# Patient Record
Sex: FEMALE | Race: WHITE | Employment: UNEMPLOYED | URBAN - METROPOLITAN AREA
[De-identification: names, ages, dates, MRNs, and addresses within clinical notes are randomized per-mention and may not be internally consistent; named-entity substitution may affect disease eponyms.]

---

## 2022-02-10 ENCOUNTER — HOSPITAL ENCOUNTER (EMERGENCY)
Age: 87
Discharge: PSYCHIATRIC OR SUBSTANCE USE FACILITY | End: 2022-02-10
Attending: EMERGENCY MEDICINE
Payer: MEDICARE

## 2022-02-10 ENCOUNTER — HOSPITAL ENCOUNTER (INPATIENT)
Age: 87
LOS: 8 days | Discharge: HOME | DRG: 057 | End: 2022-02-18
Attending: PSYCHIATRY & NEUROLOGY | Admitting: PSYCHIATRY & NEUROLOGY
Payer: MEDICARE

## 2022-02-10 VITALS
SYSTOLIC BLOOD PRESSURE: 166 MMHG | BODY MASS INDEX: 28.01 KG/M2 | DIASTOLIC BLOOD PRESSURE: 81 MMHG | WEIGHT: 158.1 LBS | TEMPERATURE: 98 F | RESPIRATION RATE: 16 BRPM | HEART RATE: 82 BPM | HEIGHT: 63 IN | OXYGEN SATURATION: 97 %

## 2022-02-10 DIAGNOSIS — F03.91: Primary | ICD-10-CM

## 2022-02-10 DIAGNOSIS — F60.3 VOLATILE MOOD (HCC): ICD-10-CM

## 2022-02-10 DIAGNOSIS — R45.5 AGGRESSIVE OUTBURST: ICD-10-CM

## 2022-02-10 DIAGNOSIS — N30.00 ACUTE CYSTITIS WITHOUT HEMATURIA: ICD-10-CM

## 2022-02-10 DIAGNOSIS — R68.89 FORGETFULNESS: ICD-10-CM

## 2022-02-10 DIAGNOSIS — R46.89 VERBALLY ABUSIVE BEHAVIOR: Primary | ICD-10-CM

## 2022-02-10 PROBLEM — R41.0 DELIRIUM: Status: ACTIVE | Noted: 2022-02-10

## 2022-02-10 LAB
ACETAMINOPHEN LEVEL: <5 UG/ML (ref 10–30)
AMPHETAMINE SCREEN, URINE: NORMAL
ANION GAP SERPL CALCULATED.3IONS-SCNC: 14 MMOL/L (ref 3–16)
BACTERIA: ABNORMAL /HPF
BARBITURATE SCREEN URINE: NORMAL
BASOPHILS ABSOLUTE: 0 K/UL (ref 0–0.2)
BASOPHILS RELATIVE PERCENT: 0.4 %
BENZODIAZEPINE SCREEN, URINE: NORMAL
BILIRUBIN URINE: NEGATIVE
BLOOD, URINE: NEGATIVE
BUN BLDV-MCNC: 11 MG/DL (ref 7–20)
CALCIUM SERPL-MCNC: 9.3 MG/DL (ref 8.3–10.6)
CANNABINOID SCREEN URINE: NORMAL
CHLORIDE BLD-SCNC: 101 MMOL/L (ref 99–110)
CLARITY: ABNORMAL
CO2: 28 MMOL/L (ref 21–32)
COCAINE METABOLITE SCREEN URINE: NORMAL
COLOR: YELLOW
CREAT SERPL-MCNC: 0.7 MG/DL (ref 0.6–1.2)
EOSINOPHILS ABSOLUTE: 0.1 K/UL (ref 0–0.6)
EOSINOPHILS RELATIVE PERCENT: 1.7 %
EPITHELIAL CELLS, UA: 1 /HPF (ref 0–5)
ETHANOL: NORMAL MG/DL (ref 0–0.08)
GFR AFRICAN AMERICAN: >60
GFR NON-AFRICAN AMERICAN: >60
GLUCOSE BLD-MCNC: 120 MG/DL (ref 70–99)
GLUCOSE URINE: NEGATIVE MG/DL
HCT VFR BLD CALC: 46.4 % (ref 36–48)
HEMOGLOBIN: 15.3 G/DL (ref 12–16)
HYALINE CASTS: 0 /LPF (ref 0–8)
KETONES, URINE: NEGATIVE MG/DL
LEUKOCYTE ESTERASE, URINE: ABNORMAL
LYMPHOCYTES ABSOLUTE: 1.6 K/UL (ref 1–5.1)
LYMPHOCYTES RELATIVE PERCENT: 23.5 %
Lab: NORMAL
MCH RBC QN AUTO: 32.6 PG (ref 26–34)
MCHC RBC AUTO-ENTMCNC: 33 G/DL (ref 31–36)
MCV RBC AUTO: 98.8 FL (ref 80–100)
METHADONE SCREEN, URINE: NORMAL
MICROSCOPIC EXAMINATION: YES
MONOCYTES ABSOLUTE: 0.5 K/UL (ref 0–1.3)
MONOCYTES RELATIVE PERCENT: 7.8 %
NEUTROPHILS ABSOLUTE: 4.4 K/UL (ref 1.7–7.7)
NEUTROPHILS RELATIVE PERCENT: 66.6 %
NITRITE, URINE: NEGATIVE
OPIATE SCREEN URINE: NORMAL
OXYCODONE URINE: NORMAL
PDW BLD-RTO: 14.1 % (ref 12.4–15.4)
PH UA: 6
PH UA: 6.5 (ref 5–8)
PHENCYCLIDINE SCREEN URINE: NORMAL
PLATELET # BLD: 205 K/UL (ref 135–450)
PMV BLD AUTO: 9.4 FL (ref 5–10.5)
POTASSIUM REFLEX MAGNESIUM: 4.1 MMOL/L (ref 3.5–5.1)
PROPOXYPHENE SCREEN: NORMAL
PROTEIN UA: NEGATIVE MG/DL
RBC # BLD: 4.7 M/UL (ref 4–5.2)
RBC UA: 2 /HPF (ref 0–4)
SALICYLATE, SERUM: <0.3 MG/DL (ref 15–30)
SARS-COV-2, NAAT: NOT DETECTED
SODIUM BLD-SCNC: 143 MMOL/L (ref 136–145)
SPECIFIC GRAVITY UA: 1 (ref 1–1.03)
URINE REFLEX TO CULTURE: YES
URINE TYPE: ABNORMAL
UROBILINOGEN, URINE: 0.2 E.U./DL
WBC # BLD: 6.7 K/UL (ref 4–11)
WBC UA: 203 /HPF (ref 0–5)

## 2022-02-10 PROCEDURE — 87077 CULTURE AEROBIC IDENTIFY: CPT

## 2022-02-10 PROCEDURE — 87635 SARS-COV-2 COVID-19 AMP PRB: CPT

## 2022-02-10 PROCEDURE — 80143 DRUG ASSAY ACETAMINOPHEN: CPT

## 2022-02-10 PROCEDURE — 82077 ASSAY SPEC XCP UR&BREATH IA: CPT

## 2022-02-10 PROCEDURE — 81001 URINALYSIS AUTO W/SCOPE: CPT

## 2022-02-10 PROCEDURE — 1240000000 HC EMOTIONAL WELLNESS R&B

## 2022-02-10 PROCEDURE — 6370000000 HC RX 637 (ALT 250 FOR IP): Performed by: PHYSICIAN ASSISTANT

## 2022-02-10 PROCEDURE — 80179 DRUG ASSAY SALICYLATE: CPT

## 2022-02-10 PROCEDURE — 87184 SC STD DISK METHOD PER PLATE: CPT

## 2022-02-10 PROCEDURE — 2500000003 HC RX 250 WO HCPCS: Performed by: PSYCHIATRY & NEUROLOGY

## 2022-02-10 PROCEDURE — 80307 DRUG TEST PRSMV CHEM ANLYZR: CPT

## 2022-02-10 PROCEDURE — 85025 COMPLETE CBC W/AUTO DIFF WBC: CPT

## 2022-02-10 PROCEDURE — 87186 SC STD MICRODIL/AGAR DIL: CPT

## 2022-02-10 PROCEDURE — 2580000003 HC RX 258: Performed by: PSYCHIATRY & NEUROLOGY

## 2022-02-10 PROCEDURE — 87086 URINE CULTURE/COLONY COUNT: CPT

## 2022-02-10 PROCEDURE — 80048 BASIC METABOLIC PNL TOTAL CA: CPT

## 2022-02-10 PROCEDURE — 99285 EMERGENCY DEPT VISIT HI MDM: CPT

## 2022-02-10 RX ORDER — HYDROXYZINE HYDROCHLORIDE 10 MG/1
25 TABLET, FILM COATED ORAL 3 TIMES DAILY PRN
Status: DISCONTINUED | OUTPATIENT
Start: 2022-02-10 | End: 2022-02-18 | Stop reason: HOSPADM

## 2022-02-10 RX ORDER — CEFUROXIME AXETIL 250 MG/1
500 TABLET ORAL ONCE
Status: COMPLETED | OUTPATIENT
Start: 2022-02-10 | End: 2022-02-10

## 2022-02-10 RX ORDER — OLANZAPINE 10 MG/1
5 INJECTION, POWDER, LYOPHILIZED, FOR SOLUTION INTRAMUSCULAR EVERY 8 HOURS PRN
Status: DISCONTINUED | OUTPATIENT
Start: 2022-02-10 | End: 2022-02-18 | Stop reason: HOSPADM

## 2022-02-10 RX ORDER — LANOLIN ALCOHOL/MO/W.PET/CERES
3 CREAM (GRAM) TOPICAL NIGHTLY PRN
Status: DISCONTINUED | OUTPATIENT
Start: 2022-02-10 | End: 2022-02-18 | Stop reason: HOSPADM

## 2022-02-10 RX ORDER — DIPHENHYDRAMINE HYDROCHLORIDE 50 MG/ML
50 INJECTION INTRAMUSCULAR; INTRAVENOUS EVERY 4 HOURS PRN
Status: DISCONTINUED | OUTPATIENT
Start: 2022-02-10 | End: 2022-02-18 | Stop reason: HOSPADM

## 2022-02-10 RX ORDER — OLANZAPINE 5 MG/1
5 TABLET ORAL EVERY 8 HOURS PRN
Status: DISCONTINUED | OUTPATIENT
Start: 2022-02-10 | End: 2022-02-18 | Stop reason: HOSPADM

## 2022-02-10 RX ORDER — POLYETHYLENE GLYCOL 3350 17 G
2 POWDER IN PACKET (EA) ORAL
Status: DISCONTINUED | OUTPATIENT
Start: 2022-02-10 | End: 2022-02-18 | Stop reason: HOSPADM

## 2022-02-10 RX ORDER — ACETAMINOPHEN 325 MG/1
650 TABLET ORAL EVERY 4 HOURS PRN
Status: DISCONTINUED | OUTPATIENT
Start: 2022-02-10 | End: 2022-02-18 | Stop reason: HOSPADM

## 2022-02-10 RX ORDER — MAGNESIUM HYDROXIDE/ALUMINUM HYDROXICE/SIMETHICONE 120; 1200; 1200 MG/30ML; MG/30ML; MG/30ML
30 SUSPENSION ORAL EVERY 6 HOURS PRN
Status: DISCONTINUED | OUTPATIENT
Start: 2022-02-10 | End: 2022-02-18 | Stop reason: HOSPADM

## 2022-02-10 RX ORDER — IBUPROFEN 400 MG/1
400 TABLET ORAL EVERY 6 HOURS PRN
Status: DISCONTINUED | OUTPATIENT
Start: 2022-02-10 | End: 2022-02-18 | Stop reason: HOSPADM

## 2022-02-10 RX ADMIN — OLANZAPINE 5 MG: 10 INJECTION, POWDER, LYOPHILIZED, FOR SOLUTION INTRAMUSCULAR at 23:45

## 2022-02-10 RX ADMIN — CEFUROXIME AXETIL 500 MG: 250 TABLET ORAL at 19:07

## 2022-02-10 RX ADMIN — WATER 2.1 ML: 1 INJECTION INTRAMUSCULAR; INTRAVENOUS; SUBCUTANEOUS at 23:46

## 2022-02-10 ASSESSMENT — ENCOUNTER SYMPTOMS
NAUSEA: 0
VOMITING: 0
ABDOMINAL PAIN: 0
SHORTNESS OF BREATH: 0
CHEST TIGHTNESS: 0

## 2022-02-10 NOTE — ED PROVIDER NOTES
I have personally performed a face to face diagnostic evaluation on this patient. I have fully participated in the care of this patient I personally saw the patient and performed a substantive portion of the visit including all aspects of the medical decision making. I have reviewed and agree with all pertinent clinical information including history, physical exam, diagnostic tests, and the plan. HPI: Adan Ngo presented with anger and agitation and concern for psychiatric evaluation from nursing facility. Patient was verbally aggressive and threatening to staff at her nursing facility. Patient was supposed to go to [facility] for psychiatric inpatient however was unable to be sent there and so she was sent to [facility] for further psychiatric evaluation and potential placement or transfer. On my examination patient is significantly agitated not complaining of anything and reporting that she just wants food and wants everyone to leave her alone and she would like to go home. Patient is cursing at nursing staff. Chief Complaint   Patient presents with    Psychiatric Evaluation      Review of Systems: See ARMANDO note  Vital Signs: BP (!) 166/81   Pulse 82   Temp 98 °F (36.7 °C) (Oral)   Resp 16   Ht 5' 3\" (1.6 m)   Wt 158 lb 1.6 oz (71.7 kg)   SpO2 97%   BMI 28.01 kg/m²     Alert 81 y.o. female who appears significantly agitated  HENT: Atraumatic, oral mucosa moist  Neck: Grossly normal ROM  Chest/Lungs: respiratory effort normal   Musculoskeletal: Grossly normal ROM  Skin: No palor or diaphoresis  Psych: Patient is very agitated and upset demanding that she leave demanding that everyone leave her alone and that she wants dinner    Medical Decision Making and Plan:  Pertinent Labs & Imaging studies reviewed. (See ARMANDO chart for details)  I agree with assessment and plan.   Concern for severe agitation and/or geriatric psych patient cannot go back to her facility at this time as she was verbally threatening to staff and has been verbally threatening here. Will order psych work-up and plan on transfer for inpatient psychiatric evaluation. See ARMANDO note for further details. Patient found to have urinary tract infection however do not believe that this is pure delirium as patient has had symptoms for greater than 2 months. Patient is now medically cleared for psychiatric evaluation.       Jose Zuleta MD  02/10/22 1840       Jose Zuleta MD  02/10/22 2035
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
## 2022-02-11 PROBLEM — N30.00 ACUTE CYSTITIS WITHOUT HEMATURIA: Status: ACTIVE | Noted: 2022-02-11

## 2022-02-11 PROBLEM — E78.5 HYPERLIPIDEMIA: Status: ACTIVE | Noted: 2022-02-11

## 2022-02-11 LAB
CHOLESTEROL, TOTAL: 233 MG/DL (ref 0–199)
HDLC SERPL-MCNC: 63 MG/DL (ref 40–60)
LDL CHOLESTEROL CALCULATED: 149 MG/DL
TRIGL SERPL-MCNC: 103 MG/DL (ref 0–150)
VLDLC SERPL CALC-MCNC: 21 MG/DL

## 2022-02-11 PROCEDURE — 6370000000 HC RX 637 (ALT 250 FOR IP): Performed by: PSYCHIATRY & NEUROLOGY

## 2022-02-11 PROCEDURE — 6370000000 HC RX 637 (ALT 250 FOR IP): Performed by: PHYSICIAN ASSISTANT

## 2022-02-11 PROCEDURE — 1240000000 HC EMOTIONAL WELLNESS R&B

## 2022-02-11 PROCEDURE — 99223 1ST HOSP IP/OBS HIGH 75: CPT | Performed by: PSYCHIATRY & NEUROLOGY

## 2022-02-11 PROCEDURE — 99221 1ST HOSP IP/OBS SF/LOW 40: CPT | Performed by: PHYSICIAN ASSISTANT

## 2022-02-11 RX ORDER — MIRTAZAPINE 15 MG/1
15 TABLET, ORALLY DISINTEGRATING ORAL NIGHTLY
Status: DISCONTINUED | OUTPATIENT
Start: 2022-02-11 | End: 2022-02-17

## 2022-02-11 RX ORDER — NITROFURANTOIN 25; 75 MG/1; MG/1
100 CAPSULE ORAL EVERY 12 HOURS SCHEDULED
Status: DISPENSED | OUTPATIENT
Start: 2022-02-11 | End: 2022-02-16

## 2022-02-11 RX ORDER — DIVALPROEX SODIUM 125 MG/1
250 CAPSULE, COATED PELLETS ORAL EVERY 8 HOURS PRN
Status: DISCONTINUED | OUTPATIENT
Start: 2022-02-11 | End: 2022-02-18 | Stop reason: HOSPADM

## 2022-02-11 RX ADMIN — MIRTAZAPINE 15 MG: 15 TABLET, ORALLY DISINTEGRATING ORAL at 19:58

## 2022-02-11 RX ADMIN — NITROFURANTOIN (MONOHYDRATE/MACROCRYSTALS) 100 MG: 75; 25 CAPSULE ORAL at 19:58

## 2022-02-11 RX ADMIN — NITROFURANTOIN (MONOHYDRATE/MACROCRYSTALS) 100 MG: 75; 25 CAPSULE ORAL at 12:51

## 2022-02-11 ASSESSMENT — PAIN SCALES - GENERAL
PAINLEVEL_OUTOF10: 0

## 2022-02-11 NOTE — FLOWSHEET NOTE
Senior Purposeful Rounding     Position:Repositions Self     Physical Environment:Room free from clutter, Clear path to bathroom , Adequate lighting, Bed alarm functioning and No safety hazards noted     Pain Rating/ Nonverbal Pain Behaviors:denies;      Pain interventions Attempted: none     Patient Toileted:No- Void
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
## 2022-02-11 NOTE — ED NOTES
Report called to [address] RN at [facility], all questions answered.      Estela Ortiz RN  02/10/22 6746

## 2022-02-11 NOTE — PROGRESS NOTES
Pt arrived at 2310 via transport from [facility].  Pt was anxious, angry, and uncooperative. She kept insisting that there had been a mistake and she was supposed to go home. She stated, \"No one told me I was coming here. I was supposed to be going home. All they said was [facility]. \"  Pt became belligerent and refused to transfer from the stretcher from the bed. When staff attempted to assist her she became combative, trying to hit. Code violet was called. With additional staff present, she was able to be coerced into transferring to the bed. She refused vital signs, interview, 4 eyes, and physical assessment.

## 2022-02-11 NOTE — FLOWSHEET NOTE
02/11/22 1128   Psychiatric History   Psychiatric history treatment   (Unable to assess due to patient sleeping and when awake, is verbally aggressive and screaming.)   Contact information Lissa Kaufman   Are there any medication issues?   (Unable to assess due to patient sleeping and when awake, is verbally aggressive and screaming.)   Support System   Support system Other (Comment)  (Per chart record, has a guardian and lives in assisted living at [place].)   Problems in support system   (Unable to assess due to patient sleeping and when awake, is verbally aggressive and screaming.)   Current Living Situation   Home Living Adequate   Living information Lives with others  (Assisted living at [place] [facility])   Problems with living situation  No   Lack of basic needs No   SSDI/SSI Unable to assess due to patient sleeping and when awake, is verbally aggressive and screaming. Other government assistance Unable to assess due to patient sleeping and when awake, is verbally aggressive and screaming. Problems with environment Unable to assess due to patient sleeping and when awake, is verbally aggressive and screaming. Current abuse issues Unable to assess due to patient sleeping and when awake, is verbally aggressive and screaming. Supervised setting   (Unable to assess due to patient sleeping and when awake, is verbally aggressive and screaming.)   Relationship problems   (Unable to assess due to patient sleeping and when awake, is verbally aggressive and screaming.)   Contact information Lissa Kaufman   Medical and Self-Care Issues   Relevant medical problems Unable to assess due to patient sleeping and when awake, is verbally aggressive and screaming. Relevant self-care issues Unable to assess due to patient sleeping and when awake, is verbally aggressive and screaming.    Barriers to treatment   (Lives in assisted living at [facility])   formerly [facility]   Spouse/partner-name/age Unable to assess due to patient sleeping and when awake, is verbally aggressive and screaming. Children-names/ages Unable to assess due to patient sleeping and when awake, is verbally aggressive and screaming. Parents Unable to assess due to patient sleeping and when awake, is verbally aggressive and screaming. Siblings Unable to assess due to patient sleeping and when awake, is verbally aggressive and screaming. Childhood   Raised by   (Unable to assess due to patient sleeping and when awake, is verbally aggressive and screaming.)   Relevant family history Unable to assess due to patient sleeping and when awake, is verbally aggressive and screaming. History of abuse   (Unable to assess due to patient sleeping and when awake, is verbally aggressive and screaming.)   Legal History   Legal history   (Unable to assess due to patient sleeping and when awake, is verbally aggressive and screaming.)   Other relevant legal issues Unable to assess due to patient sleeping and when awake, is verbally aggressive and screaming. Comment Unable to assess due to patient sleeping and when awake, is verbally aggressive and screaming.    Juvenile legal history   (Unable to assess due to patient sleeping and when awake, is verbally aggressive and screaming.)    Abuse Assessment   Physical Abuse   (Unable to assess due to patient sleeping and when awake, is verbally aggressive and screaming.)   Verbal Abuse   (Unable to assess due to patient sleeping and when awake, is verbally aggressive and screaming.)   Possible abuse reported to   (Unable to assess due to patient sleeping and when awake, is verbally aggressive and screaming.)   Emotional abuse   (Unable to assess due to patient sleeping and when awake, is verbally aggressive and screaming.)   Financial Abuse   (Unable to assess due to patient sleeping and when awake, is verbally aggressive and screaming.)   Sexual abuse   (Unable to assess due to patient sleeping and when awake, is verbally aggressive and screaming.)   Elder abuse   (Unable to assess due to patient sleeping and when awake, is verbally aggressive and screaming.)   Substance Use   Use of substances    (Unable to assess due to patient sleeping and when awake, is verbally aggressive and screaming.)   Motivation for SA Treatment   Stage of engagement   (Unable to assess due to patient sleeping and when awake, is verbally aggressive and screaming.)   Motivation for treatment   (Unable to assess due to patient sleeping and when awake, is verbally aggressive and screaming.)   Current barriers to treatment   (Unable to assess due to patient sleeping and when awake, is verbally aggressive and screaming.)   Education   Education   (Unable to assess due to patient sleeping and when awake, is verbally aggressive and screaming.)   Special education   (Unable to assess due to patient sleeping and when awake, is verbally aggressive and screaming.)   Work History   Currently employed No   Recent job loss or change   (Unable to assess due to patient sleeping and when awake, is verbally aggressive and screaming.)    service   (Unable to assess due to patient sleeping and when awake, is verbally aggressive and screaming.)   /VA involvement Unable to assess due to patient sleeping and when awake, is verbally aggressive and screaming. Leisure/Activity   Past interests Unable to assess due to patient sleeping and when awake, is verbally aggressive and screaming. Present interests Unable to assess due to patient sleeping and when awake, is verbally aggressive and screaming. Current daily activity Unable to assess due to patient sleeping and when awake, is verbally aggressive and screaming.    Cultural and Spiritual   Spiritual concerns   (Unable to assess due to patient sleeping and when awake, is verbally aggressive and screaming.)   Cultural concerns   (Unable to assess due to patient sleeping and when awake, is verbally aggressive and screaming.)   Comment Unable to assess due to patient sleeping and when awake, is verbally aggressive and screaming. Collateral Contacts   Contacts   (Unable to assess due to patient sleeping and when awake, is verbally aggressive and screaming.)     Clinician attempted to meet with patient to complete assessment but she was sleeping. Per nursing staff, she was verbally aggressive and threatening when awake and would not cooperate with their assessment. Due to these reasons, this patient was unable to be assessed in person. Dipti Bowen was brought to the hospital due to extreme verbal aggression, yelling and threatening staff at [facility] where she resides in assisted living. Patient has a POA/Guardian, Carolyn Youngblood.      [address] Michael Queen, STEPHANY

## 2022-02-11 NOTE — ED NOTES
Patient ordered dinner at this time very aggressive with staff yelling and screaming about leaving this place and wanting to go home.       Josseline Marti RN  02/10/22 1910

## 2022-02-11 NOTE — PLAN OF CARE
Pt. Slept in this morning, irritable with staff initially upon awakening but was redirectable and has become compliant and received medications, ATB for UTI, good appetite, appears to be paranoid of nursing home staff stating they are conspiring against her. Withdrawn to bedroom, good safety awareness using the call light to alert staff.

## 2022-02-11 NOTE — ED NOTES
Patient continues to be confused and ask the same questions over again, while taking about the reason she got her. Patient education attempted at this time without success.      Reji Zuñiga RN  02/10/22 2043

## 2022-02-11 NOTE — ED NOTES
Patient continues to be aggressive and telling same story over and over again about how she ended up in this er and that she wants to go home gets very upset yelling at staff when she is told she isn't able to go home at this time     Aaron Nick RN  02/10/22 7973

## 2022-02-11 NOTE — PROGRESS NOTES
Pt continued to be verbally aggressive and uncooperative shortly following the code violet. She was agitated and yelling. 5mg Zyprexa IM PRN given. Sitter ordered for safety. Pt kept saying, \"Just leave me alone\". Medication was effective and she is currently sleeping.

## 2022-02-11 NOTE — ED NOTES
Patient continues to yell at staff about getting food and going home     Rayo Galdamez RN  02/10/22 3789

## 2022-02-11 NOTE — ED NOTES
Transport here at this time to transfer patient to [facility].       Beto Boss RN  02/10/22 7274

## 2022-02-11 NOTE — ED NOTES
Patient noted to be yelling at all staff when they walk past room      Saida Watters RN  02/10/22 8213

## 2022-02-11 NOTE — PROGRESS NOTES
Behavioral Services  Medicare Certification Upon Admission    I certify that this patient's inpatient psychiatric hospital admission is medically necessary for:    [x] (1) Treatment which could reasonably be expected to improve this patient's condition,       [x] (2) Or for diagnostic study;     AND     [x](2) The inpatient psychiatric services are provided while the individual is under the care of a physician and are included in the individualized plan of care.     Estimated length of stay/service 5 days    Plan for post-hospital care ECF    Electronically signed by Laura Darden MD on 2/11/2022 at 1:45 PM

## 2022-02-11 NOTE — H&P
Hospital Medicine History & Physical      PCP: No primary care provider on file. Date of Admission: 2/10/2022    Date of Service: Pt seen/examined on  2/11/2022     Chief Complaint:  Psych eval    History Of Present Illness: The patient is a 81 y.o. female without noted medical history who presented to [facility] for psychiatric eval.  Patient was seen and evaluated in the ED by the ED medical provider, patient was medically cleared for admission to [facility] at [facility]. This note serves as an admission medical H&P. Patient denies any medical complaints     Past Medical History:        Diagnosis Date    Arthritis     Chronic knee pain        Past Surgical History:        Procedure Laterality Date    APPENDECTOMY      CHOLECYSTECTOMY      TONSILLECTOMY         Medications Prior to Admission:    Prior to Admission medications    Not on File       Allergies:  Patient has no known allergies. Social History:  The patient currently lives in assisted living facility     TOBACCO:   reports that she has never smoked. She has never used smokeless tobacco.  ETOH:   reports previous alcohol use of about 3.0 standard drinks of alcohol per week. Family History:   Positive as follows:    History reviewed. No pertinent family history.     REVIEW OF SYSTEMS:       Constitutional: Negative for fever   HENT: Negative for sore throat   Eyes: Negative for redness   Respiratory: Negative  for dyspnea, cough   Cardiovascular: Negative for chest pain   Gastrointestinal: Negative for vomiting, diarrhea   Genitourinary: Negative for hematuria   Musculoskeletal: Negative for arthralgias   Skin: Negative for rash   Neurological: Negative for syncope    Hematological: Negative for easy bruising/bleeding   Psychiatric/Behavorial: Per psychiatry team evaluation     PHYSICAL EXAM:    BP (!) 144/81   Pulse 78   Temp 97.5 °F (36.4 °C) (Tympanic)   Resp 18   Ht 5' 3\" (1.6 m)   Wt 158 lb (71.7 kg)   SpO2 93%   BMI 27.99 kg/m²     Gen: No distress. Alert. Eyes: PERRL. No sclera icterus. No conjunctival injection. ENT: No discharge. Pharynx clear. Neck: No JVD. No Carotid Bruit. Trachea midline. Resp: No accessory muscle use. No crackles. No wheezes. No rhonchi. CV: Regular rate. Regular rhythm. No murmur. No rub. No edema. GI: Non-tender. Non-distended. Normal bowel sounds. Skin: Warm and dry. No nodule on exposed extremities. No rash on exposed extremities. M/S: No cyanosis. No joint deformity. No clubbing. Neuro: Awake. No focal neurologic deficit on exam.  Cranial nerves II through XII intact.    Psych: Per psychiatry team evaluation     CBC:   Recent Labs     02/10/22  1739   WBC 6.7   HGB 15.3   HCT 46.4   MCV 98.8        BMP:   Recent Labs     02/10/22  1739      K 4.1      CO2 28   BUN 11   CREATININE 0.7       UA:  Recent Labs     02/10/22  1655   COLORU YELLOW   PHUR 6.0  6.5   WBCUA 203*   RBCUA 2   BACTERIA 2+*   CLARITYU CLOUDY*   SPECGRAV 1.005   LEUKOCYTESUR LARGE*   UROBILINOGEN 0.2   BILIRUBINUR Negative   BLOODU Negative   GLUCOSEU Negative     U/A:    Lab Results   Component Value Date    COLORU YELLOW 02/10/2022    WBCUA 203 02/10/2022    RBCUA 2 02/10/2022    BACTERIA 2+ 02/10/2022    CLARITYU CLOUDY 02/10/2022    SPECGRAV 1.005 02/10/2022    LEUKOCYTESUR LARGE 02/10/2022    BLOODU Negative 02/10/2022    GLUCOSEU Negative 02/10/2022       CULTURES  None     EKG:  I have reviewed the EKG with the following interpretation:   None     RADIOLOGY  No orders to display           ASSESSMENT/PLAN:  #Delirium  - per psychiatry team    #UTI  - macrobid D#1/5  - f/u urine culture      Meche Valadez PA-C  2/11/2022 9:20 AM

## 2022-02-12 PROCEDURE — 6370000000 HC RX 637 (ALT 250 FOR IP): Performed by: PHYSICIAN ASSISTANT

## 2022-02-12 PROCEDURE — 1240000000 HC EMOTIONAL WELLNESS R&B

## 2022-02-12 PROCEDURE — 6370000000 HC RX 637 (ALT 250 FOR IP): Performed by: PSYCHIATRY & NEUROLOGY

## 2022-02-12 RX ADMIN — NITROFURANTOIN (MONOHYDRATE/MACROCRYSTALS) 100 MG: 75; 25 CAPSULE ORAL at 20:23

## 2022-02-12 RX ADMIN — NITROFURANTOIN (MONOHYDRATE/MACROCRYSTALS) 100 MG: 75; 25 CAPSULE ORAL at 10:13

## 2022-02-12 RX ADMIN — MIRTAZAPINE 15 MG: 15 TABLET, ORALLY DISINTEGRATING ORAL at 20:23

## 2022-02-12 ASSESSMENT — PAIN SCALES - GENERAL
PAINLEVEL_OUTOF10: 0

## 2022-02-12 NOTE — PROGRESS NOTES
Pt slept all day until around 515pm. States no one woke her up, writer attempted to wake up her multiple times, but did not get up to eat. States she needs to get out of here d/t her cat needs fed. She said this place is for crazies and she is not. Small open area to her buttocks, consult to wound to eval. Verbally aggressive towards staff, also stated if she had a gun she would shoot the lady that brought her here. Asked to use the phone, when asked what number to call she said the police station. Denies all.

## 2022-02-12 NOTE — FLOWSHEET NOTE
Senior Purposeful Rounding    Position:Repositions Self    Physical Environment:Room free from clutter, Clear path to bathroom , Adequate lighting, No safety hazards noted and Stay with me protocol    Pain Rating/ Nonverbal Pain Behaviors:denies;     Pain interventions Attempted: none    Patient Toileted:Continent

## 2022-02-12 NOTE — PLAN OF CARE
Pt A+Ox4, but can be forgetful. She asked the same question about UTI symptoms several times. She is mildly irritable, but cooperative and medication compliant. She denies SI/HI/AVH and no RTIS noted. She is visible on unit socializing with peers and staff. She gets around the independently with a wheelchair. She is currently resting in her room and has no other needs at this time.

## 2022-02-12 NOTE — PLAN OF CARE
Problem: Falls - Risk of:  Goal: Will remain free from falls  Description: Will remain free from falls  Outcome: Ongoing     Problem: Falls - Risk of:  Goal: Absence of physical injury  Description: Absence of physical injury  Outcome: Ongoing     Problem: Anger Management/Homicidal Ideation:  Goal: Ability to verbalize frustrations and anger appropriately will improve  Description: Ability to verbalize frustrations and anger appropriately will improve  2/12/2022 1050 by Abad Fuchs LPN  Outcome: Ongoing     Pt. Remains free of falls at this time. Sleeping in bed. Compliant with medications. Denies all. Will continue to monitor.

## 2022-02-13 PROCEDURE — 6370000000 HC RX 637 (ALT 250 FOR IP): Performed by: PSYCHIATRY & NEUROLOGY

## 2022-02-13 PROCEDURE — 6370000000 HC RX 637 (ALT 250 FOR IP): Performed by: PHYSICIAN ASSISTANT

## 2022-02-13 PROCEDURE — 1240000000 HC EMOTIONAL WELLNESS R&B

## 2022-02-13 PROCEDURE — 99233 SBSQ HOSP IP/OBS HIGH 50: CPT | Performed by: PSYCHIATRY & NEUROLOGY

## 2022-02-13 RX ADMIN — MIRTAZAPINE 15 MG: 15 TABLET, ORALLY DISINTEGRATING ORAL at 21:08

## 2022-02-13 RX ADMIN — NITROFURANTOIN (MONOHYDRATE/MACROCRYSTALS) 100 MG: 75; 25 CAPSULE ORAL at 21:08

## 2022-02-13 RX ADMIN — NITROFURANTOIN (MONOHYDRATE/MACROCRYSTALS) 100 MG: 75; 25 CAPSULE ORAL at 09:12

## 2022-02-13 RX ADMIN — ACETAMINOPHEN 650 MG: 325 TABLET ORAL at 22:24

## 2022-02-13 ASSESSMENT — PAIN SCALES - GENERAL
PAINLEVEL_OUTOF10: 3
PAINLEVEL_OUTOF10: 7

## 2022-02-13 NOTE — BH NOTE
Pt offered shower or bed bath several times today. Refuses after multiple offers. Cleansed skin after having changed brief.

## 2022-02-13 NOTE — PLAN OF CARE
Kirby Raza has been visible in the milieu. She is very irritable the majority of the time. Continually talking about this being \"the nuthouse\" and \"[place] Flew Over the [place]oo's Nest.\" She is oriented to person , place, and time but is also forgetful and will ask the same questions multiple times. She is paranoid and writing everything down in a notebook. Med compliant after asking several questions. Denies SI/HI/AVH. Using wheelchair for ambulation. Assisted to the [facility] where she was continent of urine. No combative behavior. Will monitor overnight.

## 2022-02-13 NOTE — BH NOTE
Senior Purposeful Rounding     Position:Repositions Self     Physical Environment:Room free from clutter, Clear path to bathroom , Adequate lighting, Bed alarm functioning and No safety hazards noted     Pain Rating/ Nonverbal Pain Behaviors:0; asleep     Pain interventions Attempted: n/a     Patient Toileted:No- Void

## 2022-02-13 NOTE — BH NOTE
Senior Purposeful Rounding     Position:Repositions Self     Physical Environment:Room free from clutter, Clear path to bathroom , Adequate lighting, Bed alarm functioning and No safety hazards noted     Pain Rating/ Nonverbal Pain Behaviors:0; denies     Pain interventions Attempted: n/a     Patient Toileted: Incontinent of urine

## 2022-02-13 NOTE — BH NOTE
Senior Purposeful Rounding    Position:Sitting    Physical Environment:Room free from clutter, Clear path to bathroom , Adequate lighting and No safety hazards noted    Pain Rating/ Nonverbal Pain Behaviors:denies; 0    Pain interventions Attempted: n/a    Patient Toileted:No- Void

## 2022-02-13 NOTE — BH NOTE
Senior Purposeful Rounding    Position:Sitting    Physical Environment:Room free from clutter, Clear path to bathroom , Adequate lighting and No safety hazards noted    Pain Rating/ Nonverbal Pain Behaviors:denies; 0    Pain interventions Attempted: n/a    Patient Toileted:Continent

## 2022-02-13 NOTE — FLOWSHEET NOTE
Purposeful Rounding     Patient concerns reported: none noted     Nurse made aware: no     Patient Turned and repositioned: Independent     Patient Toileted: Assist/Incontinent of Urine     Fall Precautions in Place: Yellow non-skid socks on, Lighting appropriate, Room free of clutter and Clear path to bathroom

## 2022-02-13 NOTE — PROGRESS NOTES
Purposeful Rounding     Patient concerns reported: none noted     Nurse made aware: no     Patient Turned and repositioned: Independent     Patient Toileted: Continent, Assisted to bathroom     Fall Precautions in Place: Yellow non-skid socks on, Lighting appropriate, Room free of clutter and Clear path to bathroom

## 2022-02-13 NOTE — BH NOTE
Senior Purposeful Rounding     Position:Repositions Self     Physical Environment:Room free from clutter, Clear path to bathroom , Adequate lighting, Bed alarm functioning and No safety hazards noted     Pain Rating/ Nonverbal Pain Behaviors:0; asleep     Pain interventions Attempted: n/a     Patient Toileted: no void

## 2022-02-13 NOTE — FLOWSHEET NOTE
Purposeful Rounding     Patient concerns reported: none noted     Nurse made aware: no     Patient Turned and repositioned: Independent     Patient Toileted: Declined     Fall Precautions in Place: Yellow non-skid socks on, Lighting appropriate, Room free of clutter and Clear path to bathroom

## 2022-02-14 PROBLEM — R41.0 DELIRIUM: Status: RESOLVED | Noted: 2022-02-10 | Resolved: 2022-02-14

## 2022-02-14 PROBLEM — S06.9X9S MAJOR NEUROCOGNITIVE DISORDER AS LATE EFFECT OF TRAUMATIC BRAIN INJURY WITH BEHAVIORAL DISTURBANCE (HCC): Status: ACTIVE | Noted: 2022-02-14

## 2022-02-14 PROBLEM — F02.818 MAJOR NEUROCOGNITIVE DISORDER AS LATE EFFECT OF TRAUMATIC BRAIN INJURY WITH BEHAVIORAL DISTURBANCE (HCC): Status: ACTIVE | Noted: 2022-02-14

## 2022-02-14 PROBLEM — F03.91: Status: ACTIVE | Noted: 2022-02-14

## 2022-02-14 LAB
ORGANISM: ABNORMAL
URINE CULTURE, ROUTINE: ABNORMAL

## 2022-02-14 PROCEDURE — 99232 SBSQ HOSP IP/OBS MODERATE 35: CPT | Performed by: PSYCHIATRY & NEUROLOGY

## 2022-02-14 PROCEDURE — 1240000000 HC EMOTIONAL WELLNESS R&B

## 2022-02-14 PROCEDURE — 6370000000 HC RX 637 (ALT 250 FOR IP): Performed by: PHYSICIAN ASSISTANT

## 2022-02-14 PROCEDURE — 6370000000 HC RX 637 (ALT 250 FOR IP): Performed by: PSYCHIATRY & NEUROLOGY

## 2022-02-14 RX ADMIN — NITROFURANTOIN (MONOHYDRATE/MACROCRYSTALS) 100 MG: 75; 25 CAPSULE ORAL at 21:14

## 2022-02-14 RX ADMIN — NITROFURANTOIN (MONOHYDRATE/MACROCRYSTALS) 100 MG: 75; 25 CAPSULE ORAL at 11:17

## 2022-02-14 RX ADMIN — MIRTAZAPINE 15 MG: 15 TABLET, ORALLY DISINTEGRATING ORAL at 21:14

## 2022-02-14 ASSESSMENT — PAIN SCALES - GENERAL: PAINLEVEL_OUTOF10: 0

## 2022-02-14 NOTE — BH NOTE
Senior Purposeful Rounding    Position:Repositions Self    Physical Environment:Room free from clutter, Clear path to bathroom , Adequate lighting, Bed alarm functioning and No safety hazards noted    Pain Rating/ Nonverbal Pain Behaviors:7, back    Pain interventions Attempted: PRN Tylenol effective    Patient Toileted:No- Void

## 2022-02-14 NOTE — BH NOTE
Senior Purposeful Rounding     Position:Sitting     Physical Environment:Room free from clutter, Clear path to bathroom , Adequate lighting and No safety hazards noted     Pain Rating/ Nonverbal Pain Behaviors:denies; 0     Pain interventions Attempted: n/a     Patient Toileted:Continent/incontinent

## 2022-02-14 NOTE — BH NOTE
Verbal consent for voluntary admission received via phone from pt's Isidro Hodge. Angelo Arroyo: [phone].       [facility]  2/14/2022

## 2022-02-14 NOTE — PLAN OF CARE
John Worrell is oriented to person, place, and time. She has been much less irritable tonight. Spent most of the evening out socializing with a female peer. She denies SI/HI/AVH. Med compliant. Both continent and incontinent of urine. Good appetite. Remains intrusive at times. Asleep at present. Will monitor overnight.

## 2022-02-14 NOTE — PROGRESS NOTES
Department of Psychiatry  Progress Note    Patient's chart was reviewed. Discussed with treatment team. Met with patient. SUBJECTIVE:      Sitting in room doorway with notebook. Suspicious. \"i've been waiting for you! Get me out of this hell. I'm going to [place]. \"    Mostly oriented thought believes it's May. Believes her friends and nursing home aids conspired to have her hospitalized because she is white. Can't think of any other reason she might be here. ROS:   Patient has new complaints: no  Sleeping adequately:  Yes   Appetite adequate: Yes  Engaged in programming: some    OBJECTIVE:  VITALS:  /82   Pulse 84   Temp 97.3 °F (36.3 °C) (Temporal)   Resp 16   Ht 5' 3\" (1.6 m)   Wt 158 lb (71.7 kg)   SpO2 92%   BMI 27.99 kg/m²     Mental Status Examination:    Appearance: fair grooming and hygiene  Behavior/Attitude toward examiner: grumpy, good eye contact  Speech: Normal rate, volume, amount  Mood:  \"fine\"  Affect:  irritable     Thought processes:  Goal directed, linear, no JABIER or gross disorganization  Thought Content: no SI, no HI, paranoid; delusional   Perceptions: not RTIS  Attention: attention span and concentration were intact to interview   Abstraction: intact  Cognition:  Alert and oriented to person, place, time, and situation, recall intact  Insight: Limited   Judgment: Limited    Medication:  Scheduled:   nitrofurantoin (macrocrystal-monohydrate)  100 mg Oral 2 times per day    mirtazapine  15 mg Oral Nightly        PRN:  divalproex, acetaminophen, ibuprofen, nicotine polacrilex, aluminum & magnesium hydroxide-simethicone, hydrOXYzine, diphenhydrAMINE, bisacodyl, melatonin, OLANZapine **OR** OLANZapine, sterile water     ASSESSMENT AND PLAN:    Principal Problem:    Delirium  Active Problems:    Acute cystitis without hematuria  Resolved Problems:    * No resolved hospital problems. *       1. Patient s symptoms   show no change  2. Probable discharge is undetermined 3.Discharge planning is incomplete  4. Suicidal ideation is absent    Total time with patient was 35 minutes and more than 50 % of that time was spent counseling the patient on their symptoms, treatment, and expected goals.        Karyle Neighbours, MD

## 2022-02-14 NOTE — PROGRESS NOTES
Department of Psychiatry  AttendingProgress Note    Patient continues to be irritable with staff. She was noted  Interacting with one peer and doing well. She has been noted to have poor memory and asking things over and over    The treatment team met and discussed the treatment plan. OBJECTIVE    Patient sleeping. Awakens briefly, then goes back to sleep    Physical  Vitals:    Blood pressure 136/86, pulse 82, temperature 97.9 °F (36.6 °C), temperature source Temporal, resp. rate 16, height 5' 3\" (1.6 m), weight 158 lb (71.7 kg), SpO2 96 %.   General appearance:  []  appears age, []  appears older than stated age,     []  adequately dressed and groomed, [] disheveled,                []  in no acute distress, [] appears mildly distressed,      []  Other:      MUSCULOSKELETAL:   Gait:   [] normal, [] antalgic, [] unsteady, [] in bed, gait not evaluated   Station:  [] erect, [] sitting, [] recumbent, [] other        Strength/tone:  [] muscle strength and tone appear consistent with age and condition     [] atrophy      [] abnormal movements  PSYCHIATRIC:    Relatedness:  [] cooperative, [] guarded, [] indifferent, [] hostile,      [] sedated  Speech:  [] normal prosody, [] pressured, [] decreased volume,    [] slurred, [] halting, [] slowed, [] loud     [] echolalia, [] incoherent, [] stuttering   Eye contact:  [] direct, [] avoidant, [] intense  Kinetics:  [] normal, [] increased, [] decreased  Mood:   [] euthymic, [] depressed, [] anxious, [] irritable,     [] labile  Affect:   [] normal range, [] constricted, [] depressed, [] anxious,     [] angry, [] blunted, [] flat  Hallucinations  [] denies, [] auditory,  [] visual,  [] olfactory, [] tactile  Delusions  [] none, [] grandiose,  [] jealous,  [] persecutory,  [] somatic,     [] bizarre  Preoccupations  [] none, [] violence, [] obsessions, [] other     Suicidal ideation  [] denies, [] endorses  Homicidal ideation [] denies, [] endorses  Thought process: [] logical, [] circumstantial, [] tangential     [] concrete, [] disorganized  Associations:  [] logical and coherent, [] loosening, [] disorganized  Insight:   [] good, [] fair, [] poor  Judgment:  [] good, [] fair, [] poor  Attention and concentration:     [] intact, [] limited, [] able to focus on interview,     [] grossly impaired  Orientation:  [] person, place, time, situation     [] disoriented to:     Memory:  Remote memory [] intact, [] impaired     Recent memory  [] intact, [] impaired          Data  Labs:   Admission on 02/10/2022   Component Date Value Ref Range Status    Cholesterol, Total 02/10/2022 233* 0 - 199 mg/dL Final    Triglycerides 02/10/2022 103  0 - 150 mg/dL Final    HDL 02/10/2022 63* 40 - 60 mg/dL Final    LDL Calculated 02/10/2022 149* <100 mg/dL Final    VLDL Cholesterol Calculated 02/10/2022 21  Not Established mg/dL Final            Medications  Current Facility-Administered Medications: nitrofurantoin (macrocrystal-monohydrate) (MACROBID) capsule 100 mg, 100 mg, Oral, 2 times per day  mirtazapine (REMERON SOL-TAB) disintegrating tablet 15 mg, 15 mg, Oral, Nightly  divalproex (DEPAKOTE SPRINKLE) capsule 250 mg, 250 mg, Oral, Q8H PRN  acetaminophen (TYLENOL) tablet 650 mg, 650 mg, Oral, Q4H PRN  ibuprofen (ADVIL;MOTRIN) tablet 400 mg, 400 mg, Oral, Q6H PRN  nicotine polacrilex (COMMIT) lozenge 2 mg, 2 mg, Oral, Q1H PRN  aluminum & magnesium hydroxide-simethicone (MAALOX) 200-200-20 MG/5ML suspension 30 mL, 30 mL, Oral, Q6H PRN  hydrOXYzine (ATARAX) tablet 25 mg, 25 mg, Oral, TID PRN  diphenhydrAMINE (BENADRYL) injection 50 mg, 50 mg, IntraMUSCular, Q4H PRN  bisacodyl (DULCOLAX) EC tablet 5 mg, 5 mg, Oral, Daily PRN  melatonin tablet 3 mg, 3 mg, Oral, Nightly PRN  OLANZapine (ZYPREXA) tablet 5 mg, 5 mg, Oral, Q8H PRN **OR** OLANZapine (ZYPREXA) injection 5 mg, 5 mg, IntraMUSCular, Q8H PRN  sterile water injection 2.1 mL, 2.1 mL, IntraMUSCular, Q4H PRN    ASSESSMENT AND PLAN    Principal Problem:    Major neurocognitive disorder due to possible Alzheimer's disease, with behavioral disturbance ([address]  Active Problems:    Acute cystitis without hematuria  Resolved Problems:    Delirium     Patient's delirium has resolved. Has been noted to have poor memory as a chronic condition. She is noted to have poor memory during this hospitalization and is suspected to have dementia. OT/PT have been consulted. 1.Patient's symptoms show no change  2. Probable discharge is next week  3. Discharge planning is incomplete    PLAN:  Continue Remeron for sleep/behavior/irritability  OT/PT

## 2022-02-14 NOTE — PLAN OF CARE
Problem: Falls - Risk of:  Goal: Will remain free from falls  Description: Will remain free from falls  2/14/2022 1421 by Alex Mcmahon RN  Outcome: Ongoing  2/14/2022 0131 by Reene Granados RN  Outcome: Ongoing     Problem: Anger Management/Homicidal Ideation:  Goal: Ability to verbalize frustrations and anger appropriately will improve  Description: Ability to verbalize frustrations and anger appropriately will improve  2/14/2022 1421 by Alex Mcmahon RN  Outcome: Ongoing  2/14/2022 0131 by Renee Granados RN  Outcome: Ongoing     Problem: Anger Management/Homicidal Ideation:  Goal: Absence of angry outbursts  Description: Absence of angry outbursts  2/14/2022 1421 by Alex Mcmahon RN  Outcome: Ongoing  2/14/2022 0131 by Renee Granados RN  Outcome: Ongoing   Pt incontinent of bowel and bladder but becomes irritated. Changed pt, taryn care completed and barrier cream applied. Small open area noted on right buttock. Pt refused shower. Refused to get out of bed again becoming agitated with writer when asked to do so. Ate 100% of lunch in bed, went back to sleep.

## 2022-02-15 PROCEDURE — 1240000000 HC EMOTIONAL WELLNESS R&B

## 2022-02-15 PROCEDURE — 6370000000 HC RX 637 (ALT 250 FOR IP): Performed by: PSYCHIATRY & NEUROLOGY

## 2022-02-15 PROCEDURE — 97162 PT EVAL MOD COMPLEX 30 MIN: CPT

## 2022-02-15 PROCEDURE — 6370000000 HC RX 637 (ALT 250 FOR IP): Performed by: PHYSICIAN ASSISTANT

## 2022-02-15 PROCEDURE — 97535 SELF CARE MNGMENT TRAINING: CPT

## 2022-02-15 PROCEDURE — 97116 GAIT TRAINING THERAPY: CPT

## 2022-02-15 PROCEDURE — 97542 WHEELCHAIR MNGMENT TRAINING: CPT

## 2022-02-15 PROCEDURE — 99231 SBSQ HOSP IP/OBS SF/LOW 25: CPT | Performed by: NURSE PRACTITIONER

## 2022-02-15 PROCEDURE — 99231 SBSQ HOSP IP/OBS SF/LOW 25: CPT | Performed by: PSYCHIATRY & NEUROLOGY

## 2022-02-15 PROCEDURE — 97530 THERAPEUTIC ACTIVITIES: CPT

## 2022-02-15 PROCEDURE — 97166 OT EVAL MOD COMPLEX 45 MIN: CPT

## 2022-02-15 RX ADMIN — MIRTAZAPINE 15 MG: 15 TABLET, ORALLY DISINTEGRATING ORAL at 20:54

## 2022-02-15 RX ADMIN — NITROFURANTOIN (MONOHYDRATE/MACROCRYSTALS) 100 MG: 75; 25 CAPSULE ORAL at 20:54

## 2022-02-15 NOTE — FLOWSHEET NOTE
Senior Purposeful Rounding    Position:Repositions Self    Physical Environment:Room free from clutter, Clear path to bathroom , Adequate lighting, Bed alarm functioning and No safety hazards noted    Pain Rating/ Nonverbal Pain Behaviors:pt sleeping    Pain interventions Attempted: NA    Patient Toileted:No- Void

## 2022-02-15 NOTE — PROGRESS NOTES
Progress Note    Admit Date:  2/10/2022    Subjective:  Ms. Chrissy Epstein seen sitting in the common area. States she needs to talk to the doctor because she needs to get out of here. Objective:   Vitals:    02/14/22 2030   BP: (!) 148/79   Pulse: 72   Resp: 18   Temp: 97.5 °F (36.4 °C)   SpO2: 96%        No intake or output data in the 24 hours ending 02/15/22 1255    Physical Exam:    Gen: No distress. Alert. Eyes: PERRL. No sclera icterus. No conjunctival injection. ENT: No discharge. Pharynx clear. Neck: No JVD. No Carotid Bruit. Trachea midline. Resp: No accessory muscle use. No crackles. No wheezes. No rhonchi. CV: Regular rate. Regular rhythm. No murmur. No rub. No edema. GI: Non-tender. Non-distended. Normal bowel sounds. Skin: Warm and dry. No nodule on exposed extremities. No rash on exposed extremities. M/S: No cyanosis. No joint deformity. No clubbing. Neuro: Awake. No focal neurologic deficit on exam.  Cranial nerves II through XII intact. Psych: Per psychiatry team evaluation     Data:  CBC: No results for input(s): WBC, HGB, HCT, MCV, PLT in the last 72 hours. BMP: No results for input(s): NA, K, CL, CO2, PHOS, BUN, CREATININE, CA in the last 72 hours. LIVER PROFILE: No results for input(s): AST, ALT, LIPASE, BILIDIR, BILITOT, ALKPHOS in the last 72 hours. Invalid input(s): AMYLASE,  ALB  PT/INR: No results for input(s): PROTIME, INR in the last 72 hours.     CULTURES  Susceptibility     Escherichia coli (esbl)     BACTERIAL SUSCEPTIBILITY PANEL BY FRANCISCO     amoxicillin-clavulanate <=8/4 mcg/mL Sensitive     ampicillin >16 mcg/mL Resistant     ampicillin-sulbactam <=8/4 mcg/mL Sensitive     ceFAZolin >16 mcg/mL Resistant     cefepime >16 mcg/mL Resistant     cefTRIAXone >32 mcg/mL Resistant     cefuroxime >16 mcg/mL Resistant     ciprofloxacin <=1 mcg/mL Sensitive     ertapenem <=0.5 mcg/mL Sensitive     gentamicin <=4 mcg/mL Sensitive     meropenem <=1 mcg/mL Sensitive nitrofurantoin <=32 mcg/mL Sensitive     trimethoprim-sulfamethoxazole <=2/38 mcg/mL Sensitive      COVID: not detected      Assessment/Plan:  #Delirium  - per psychiatry team     #UTI  - macrobid D#4/5  - urine cx as above. Refused dose this morning. Diet: ADULT DIET;  Regular  Code Status: Full Code    Joce Mejia [facility]  2/15/2022

## 2022-02-15 NOTE — FLOWSHEET NOTE
Senior Purposeful Rounding    Position:Repositions Self    Physical Environment:Room free from clutter, Clear path to bathroom , Adequate lighting and No safety hazards noted    Pain Rating/ Nonverbal Pain Behaviors:0; none    Pain interventions Attempted: NA    Patient Toileted: No Void

## 2022-02-15 NOTE — PROGRESS NOTES
Department of Psychiatry  AttendingProgress Note    Chief Complaint: \"I'm not crazy\"    Sujatha Vines is annoyed about still being here and wants to go back to [facility]. She denies having caused a disturbance there. She says it is \"all lies. \"    She has not had any violent behavior since the first day she arrived. She has grouchy times in the morning but gets nicer in the afternoon. Memory still poor. The treatment team met and discussed the treatment plan. SUBJECTIVE:        Suicidal ideation:  denies suicidal ideation. Patient does not have medication side effects. ROS: Patient has new complaints: no  Sleeping adequately:  Yes   Appetite adequate: Yes  Attending groups: Yes      OBJECTIVE    Physical  Vitals:    Blood pressure (!) 148/79, pulse 72, temperature 97.5 °F (36.4 °C), temperature source Temporal, resp. rate 18, height 5' 3\" (1.6 m), weight 158 lb (71.7 kg), SpO2 96 %.   General appearance:  [x]  appears age, []  appears older than stated age,     [x]  adequately dressed and groomed, [] disheveled,                []  in no acute distress, [] appears mildly distressed,      []  Other:      MUSCULOSKELETAL:   Gait:   [x] normal, [] antalgic, [] unsteady, [] in bed, gait not evaluated   Station:  [x] erect, [] sitting, [] recumbent, [] other        Strength/tone:  [x] muscle strength and tone appear consistent with age and condition     [] atrophy      [] abnormal movements  PSYCHIATRIC:    Relatedness:  [x] cooperative, [] guarded, [] indifferent, [] hostile,      [] sedated  Speech:  [x] normal prosody, [] pressured, [] decreased volume,    [] slurred, [] halting, [] slowed, [] loud     [] echolalia, [] incoherent, [] stuttering   Eye contact:  [x] direct, [] avoidant, [] intense  Kinetics:  [x] normal, [] increased, [] decreased  Mood:   [x] euthymic, [] depressed, [] anxious, [] irritable,     [] labile  Affect:   [] normal range, [x] constricted, [] depressed, [] anxious,     [] angry, [] blunted, [] flat  Hallucinations  [x] denies, [] auditory,  [] visual,  [] olfactory, [] tactile  Delusions  [x] none, [] grandiose,  [] jealous,  [] persecutory,  [] somatic,     [] bizarre  Preoccupations  [] none, [] violence, [] obsessions, [] other     Suicidal ideation  [x] denies, [] endorses  Homicidal ideation [x] denies, [] endorses  Thought process: [x] logical, [] circumstantial, [] tangential     [] concrete, [] disorganized  Associations:  [x] logical and coherent, [] loosening, [] disorganized  Insight:   [] good, [] fair, [x] poor  Judgment:  [] good, [] fair, [x] poor  Attention and concentration:     [] intact, [x] limited, [] able to focus on interview,     [] grossly impaired  Orientation:  [] person, place, time, situation     [x] disoriented to: Place, date    Memory:  Remote memory [] intact, [x] impaired     Recent memory  [] intact, [x] impaired          Data  Labs:   Admission on 02/10/2022   Component Date Value Ref Range Status    Cholesterol, Total 02/10/2022 233* 0 - 199 mg/dL Final    Triglycerides 02/10/2022 103  0 - 150 mg/dL Final    HDL 02/10/2022 63* 40 - 60 mg/dL Final    LDL Calculated 02/10/2022 149* <100 mg/dL Final    VLDL Cholesterol Calculated 02/10/2022 21  Not Established mg/dL Final            Medications  Current Facility-Administered Medications: nitrofurantoin (macrocrystal-monohydrate) (MACROBID) capsule 100 mg, 100 mg, Oral, 2 times per day  mirtazapine (REMERON SOL-TAB) disintegrating tablet 15 mg, 15 mg, Oral, Nightly  divalproex (DEPAKOTE SPRINKLE) capsule 250 mg, 250 mg, Oral, Q8H PRN  acetaminophen (TYLENOL) tablet 650 mg, 650 mg, Oral, Q4H PRN  ibuprofen (ADVIL;MOTRIN) tablet 400 mg, 400 mg, Oral, Q6H PRN  nicotine polacrilex (COMMIT) lozenge 2 mg, 2 mg, Oral, Q1H PRN  aluminum & magnesium hydroxide-simethicone (MAALOX) 200-200-20 MG/5ML suspension 30 mL, 30 mL, Oral, Q6H PRN  hydrOXYzine (ATARAX) tablet 25 mg, 25 mg, Oral, TID PRN  diphenhydrAMINE (BENADRYL) injection 50 mg, 50 mg, IntraMUSCular, Q4H PRN  bisacodyl (DULCOLAX) EC tablet 5 mg, 5 mg, Oral, Daily PRN  melatonin tablet 3 mg, 3 mg, Oral, Nightly PRN  OLANZapine (ZYPREXA) tablet 5 mg, 5 mg, Oral, Q8H PRN **OR** OLANZapine (ZYPREXA) injection 5 mg, 5 mg, IntraMUSCular, Q8H PRN  sterile water injection 2.1 mL, 2.1 mL, IntraMUSCular, Q4H PRN    ASSESSMENT AND PLAN    Principal Problem:    Major neurocognitive disorder due to 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 Patient's symptoms show no change  2. Probable discharge is next week  3. Discharge planning is incomplete    PLAN:  Continue current meds  Discharge planning underway      Total time with patient was 40 minutes and more than 50 % of that time was spent counseling the patient on their symptoms, treatment and expected goals.

## 2022-02-15 NOTE — BH NOTE
Wound care nurse phoned Mallory lora to continue to apply barrier cream and she will be in to assess pt on 2/15

## 2022-02-15 NOTE — FLOWSHEET NOTE
Senior Purposeful Rounding    Position:Repositions Self    Physical Environment:No safety hazards noted, Bed alarm    Pain Rating/ Nonverbal Pain Behaviors:denies; none    Pain interventions Attempted: NA    Patient Toileted:Incontinent of bladder, incontinence care provided, diaper changed and barrier cream applied
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
## 2022-02-15 NOTE — CONSULTS
[facility] Nurse  Consult Note       NAME:  Collie Skiff  MEDICAL RECORD NUMBER:  [id number]  AGE: 81 y.o. GENDER: female  : 1935  TODAY'S DATE:  2/15/2022    Subjective Pt agreeable to assessment, cooperative. Reason for WOCN Evaluation and Assessment: buttocks, sacrum      Collie Skiff is a 81 y.o. female referred by:   [x] Physician  [x] Nursing  [] Other:     Wound Identification:  Wound Type: MASD  Contributing Factors: incontinence of stool and incontinence of urine    Consult received for open areas to buttocks. Pt is incontinent of urine and stool, attends in place. Area appears to be resolving. Staff has been applying zinc moisture barrier, already in room    Patient Goal of Care:  [x] Wound Healing  [] Odor Control  [] Palliative Care  [] Pain Control   [] Other:         PAST MEDICAL HISTORY        Diagnosis Date    Arthritis     Chronic knee pain        PAST SURGICAL HISTORY    Past Surgical History:   Procedure Laterality Date    APPENDECTOMY      CHOLECYSTECTOMY      TONSILLECTOMY         FAMILY HISTORY    History reviewed. No pertinent family history. SOCIAL HISTORY    Social History     Tobacco Use    Smoking status: Never Smoker    Smokeless tobacco: Never Used   Substance Use Topics    Alcohol use: Not Currently     Alcohol/week: 3.0 standard drinks     Types: 3 Cans of beer per week    Drug use: No       ALLERGIES    No Known Allergies    MEDICATIONS    No current facility-administered medications on file prior to encounter. No current outpatient medications on file prior to encounter.        Objective    BP (!) 148/79   Pulse 72   Temp 97.5 °F (36.4 °C) (Temporal)   Resp 18   Ht 5' 3\" (1.6 m)   Wt 158 lb (71.7 kg)   SpO2 96%   BMI 27.99 kg/m²     LABS:  WBC:    Lab Results   Component Value Date    WBC 6.7 02/10/2022     H/H:    Lab Results   Component Value Date    HGB 15.3 02/10/2022    HCT 46.4 02/10/2022     PTT:  No results found for: APTT, PTT[APTT}  PT/INR:  No results found for: PROTIME, INR  HgBA1c:  No results found for: LABA1C    Assessment   Nicolás Risk Score: Nicolás Scale Score: 18    Patient Active Problem List   Diagnosis Code    Infection of finger L08.9    Acute cystitis without hematuria N30.00    Major neurocognitive disorder due to possible Alzheimer's disease, with behavioral disturbance ([facility] [address] F03.91       Measurements:  Wound 11/30/13  Finger (Comment which one) Right (Active)   Number of days: 2998     buttocks:  Small amount of healing partial thickness skin loss to left buttock. Bilateral buttocks with chronic dusky discoloration. Response to treatment:  Well tolerated by patient. Pain Assessment:  Severity:  0 / 10  Quality of pain: N/A  Wound Pain Timing/Severity: none  Premedicated: N/A      Buttocks:  Utilize Zinc Oxide moisture barrier. Seat cushion when pt in chair. Change attends as soon as possible with incontinent episodes. Plan of Care:      Specialty Bed Required : No   [] Low Air Loss   [] Pressure Redistribution  [] Fluid Immersion  [] Bariatric  [] Total Pressure Relief  [] Other:     Current Diet: ADULT DIET;  Regular  Dietician consult:  N/A    Discharge Plan:  Placement for patient upon discharge: TBD  Patient appropriate for Outpatient [address]: No    Referrals:  [x]   [] [facility]  [] Supplies  [] Other    Patient/Caregiver Teaching:  Level of patient/caregiver understanding able to:   [x] Indicates understanding       [x] Needs reinforcement  [] Unsuccessful      [] Verbal Understanding  [] Demonstrated understanding       [] No evidence of learning  [] Refused teaching         [] N/A       Electronically signed by Renu George RN, CWOCN on 2/15/2022 at 1:38 PM

## 2022-02-15 NOTE — BH NOTE
Purposeful Rounding    Patient concerns reported:none noted    Nurse made aware:no    Patient Turned and repositioned: Independent    Patient Toileted: Independent    Fall Precautions in Place: Yellow non-skid socks on, Lighting appropriate, Room free of clutter and Clear path to bathroom      Electronically signed by Jacque Ruggiero on 2/15/22 at 11:03 AM EST

## 2022-02-15 NOTE — BH NOTE
Purposeful Rounding    Patient concerns reported:none noted    Nurse made aware:no    Patient Turned and repositioned: Independent    Patient Toileted: Independent    Fall Precautions in Place: Yellow non-skid socks on, Lighting appropriate, Room free of clutter and Clear path to bathroom      Electronically signed by Terese Bowman on 2/15/22 at 5:47 PM EST

## 2022-02-15 NOTE — GROUP NOTE
Group Therapy Note    Date: 2/15/2022    Group Start Time: 1330  Group End Time: 2835  Group Topic: Recreational    [id number]        Group Therapy Note    Attendees: 4    Group members engaged in reminiscing therapy. Notes:  Jordon George engaged in conversation about her Psychology Degree as well as her career at [employer].      Status After Intervention:  Improved    Participation Level: Interactive    Participation Quality: Appropriate      Speech:  normal      Thought Process/Content: Logical      Affective Functioning: Blunted      Mood: irritable      Level of consciousness:  Alert      Response to Learning: Able to verbalize current knowledge/experience      Endings: None Reported    Modes of Intervention: Exploration      Discipline Responsible: Behavorial Health Tech      Signature:  SUE [place]

## 2022-02-15 NOTE — BH NOTE
This morning, following engagement in treatment from ancillary staff, Berl Party is sitting at the table eating breakfast. Adamantly refusing socialization and offered resources for leisure/recreation. Argumentative and minimizing needs, refusing all offered help. Will follow up for continued encouragement to engage in unit milieu as appropriate.      Britt Jones MM, MT-BC  Board-Certified Music Therapist  Psychoeducation Specialist

## 2022-02-15 NOTE — FLOWSHEET NOTE
Senior Purposeful Rounding    Position:Repositions Self    Physical Environment:Clear path to bathroom , Adequate lighting, Bed alarm functioning and No safety hazards noted    Pain Rating/ Nonverbal Pain Behaviors:pt sleeping    Pain interventions Attempted: NA    Patient Toileted:No- Void

## 2022-02-15 NOTE — PLAN OF CARE
Patient denies SI/HI, AVH and pain. Patient medication compliant and had evening snack. Patient incontinent of urine, incontinence care provided and barrier cream apply, pt has an open area to right buttock. Patient cooperative with care. Patient continent of bowel and had large BM. Patient asking staff \"How do I get out of this place? \" Patient stated,\" My friend the nurse that takes care of me, her  is an  and he will lillie for me. I shouldn't be here. I'm not crazy. \" Patient free of falls and no angry outbursts. Patient monitored q 15 minutes for safety.
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
## 2022-02-16 PROCEDURE — 99231 SBSQ HOSP IP/OBS SF/LOW 25: CPT | Performed by: PSYCHIATRY & NEUROLOGY

## 2022-02-16 PROCEDURE — 1240000000 HC EMOTIONAL WELLNESS R&B

## 2022-02-16 PROCEDURE — 6370000000 HC RX 637 (ALT 250 FOR IP): Performed by: PSYCHIATRY & NEUROLOGY

## 2022-02-16 ASSESSMENT — PAIN SCALES - GENERAL: PAINLEVEL_OUTOF10: 0

## 2022-02-16 NOTE — PROGRESS NOTES
Pt continues to be irritable w/ staff. She is cooperative w/ care. Incontinent of urine. Continent of BM x1. No medications due this shift. Pt is up w/ x1 min assist to transfer to w/c. Pt is able to maneuver w/c independently to get around unit. Pt denies SI/HI/AVH, no RTIS noted. Pt does continue to express paranoia regarding staff at [facility] and staff here on the unit. Will continue to monitor.

## 2022-02-16 NOTE — PLAN OF CARE
Patient visible and social with select peer watching TV. Med compliant. Patient denies SI/HI/AVH. Patient sarcastic at times but cooperative. No behavioral issues Will continue to monitor.

## 2022-02-16 NOTE — BH NOTE
Senior Purposeful Rounding     Position:Repositions Self     Physical Environment:Room free from clutter, Clear path to bathroom , Adequate lighting, Bed alarm functioning and No safety hazards noted     Pain Rating/ Nonverbal Pain Behaviors:0; asleep     Pain interventions Attempted: n/a     Patient Toileted:No void

## 2022-02-16 NOTE — PROGRESS NOTES
Cala Shone is angry at me because she thinks she doesn't belong here, because \"I'm not a crazy person. \"  She tells me she wants to be left alone and doesn't want anything except to \"get out of here. \"    She continues to be grouchy, keeps to herself in her room much of the time. Sleeping at night. Repeatedly      The treatment team met and discussed the treatment plan.     SUBJECTIVE:         Suicidal ideation:  denies suicidal ideation. Patient does not have medication side effects. ROS: Patient has new complaints: no  Sleeping adequately:  Yes   Appetite adequate: Yes  Attending groups: Yes        OBJECTIVE     Physical  Vitals:                           Blood pressure (!) 148/79, pulse 72, temperature 97.5 °F (36.4 °C), temperature source Temporal, resp. rate 18, height 5' 3\" (1.6 m), weight 158 lb (71.7 kg), SpO2 96 %. General appearance:  [x]? appears age, []?  appears older than stated age,                                      [x]?   adequately dressed and groomed, []? disheveled,                                      []?  in no acute distress, []? appears mildly distressed,                                      []?  Other:                                  MUSCULOSKELETAL:            Gait:                             [x]? normal, []? antalgic, []? unsteady, []? in bed, gait not evaluated   Station:                        []? erect, [x]? sitting, []? recumbent, []? other                             Strength/tone:             [x]? muscle strength and tone appear consistent with age and condition                                      []? atrophy                                       []? abnormal movements  PSYCHIATRIC:    Relatedness:               [x]? cooperative, []? guarded, []? indifferent, []? hostile,                                               []? sedated  Speech:                       [x]? normal prosody, []? pressured, []? decreased volume,              []? slurred, []? halting, []? slowed, []? loud []? echolalia, []? incoherent, []? stuttering   Eye contact:                [x]? direct, []? avoidant, []? intense  Kinetics:                      [x]? normal, []? increased, []? decreased  Mood:                          [x]? euthymic, []? depressed, []? anxious, []? irritable,                                      []? labile  Affect:                          []? normal range, [x]? constricted, []? depressed, []? anxious,                                      []? angry, []? blunted, []? flat  Hallucinations              [x]? denies, []? auditory,  []? visual,  []? olfactory, []? tactile  Delusions                    [x]? none, []? grandiose,  []? jealous,  []? persecutory,  []? somatic,                                      []? bizarre  Preoccupations           []? none, []? violence, []? obsessions, []? other                                   Suicidal ideation          [x]? denies, []? endorses  Homicidal ideation      [x]? denies, []? endorses  Thought process:        [x]? logical, []? circumstantial, []? tangential                                      []? concrete, []? disorganized  Associations:               [x]? logical and coherent, []? loosening, []? disorganized  Insight:                         []? good, []? fair, [x]? poor  Judgment:                   []? good, []? fair, [x]? poor  Attention and concentration:                                      []? intact, [x]? limited, []? able to focus on interview,                                      []? grossly impaired  Orientation:                 []? person, place, time, situation                                      [x]? disoriented to: Place, date               Memory:                      Remote memory []? intact, [x]? impaired                                      Recent memory  []? intact, [x]?  impaired                             Data  Labs:           Admission on 02/10/2022   Component Date Value Ref Range Status    Cholesterol, Total 02/10/2022 233* 0 - 199 mg/dL Final    Triglycerides 02/10/2022 103  0 - 150 mg/dL Final    HDL 02/10/2022 63* 40 - 60 mg/dL Final    LDL Calculated 02/10/2022 149* <100 mg/dL Final    VLDL Cholesterol Calculated 02/10/2022 21  Not Established mg/dL Final             Medications    Current Hospital Medications   Current Facility-Administered Medications: nitrofurantoin (macrocrystal-monohydrate) (MACROBID) capsule 100 mg, 100 mg, Oral, 2 times per day  mirtazapine (REMERON SOL-TAB) disintegrating tablet 15 mg, 15 mg, Oral, Nightly  divalproex (DEPAKOTE SPRINKLE) capsule 250 mg, 250 mg, Oral, Q8H PRN  acetaminophen (TYLENOL) tablet 650 mg, 650 mg, Oral, Q4H PRN  ibuprofen (ADVIL;MOTRIN) tablet 400 mg, 400 mg, Oral, Q6H PRN  nicotine polacrilex (COMMIT) lozenge 2 mg, 2 mg, Oral, Q1H PRN  aluminum & magnesium hydroxide-simethicone (MAALOX) 200-200-20 MG/5ML suspension 30 mL, 30 mL, Oral, Q6H PRN  hydrOXYzine (ATARAX) tablet 25 mg, 25 mg, Oral, TID PRN  diphenhydrAMINE (BENADRYL) injection 50 mg, 50 mg, IntraMUSCular, Q4H PRN  bisacodyl (DULCOLAX) EC tablet 5 mg, 5 mg, Oral, Daily PRN  melatonin tablet 3 mg, 3 mg, Oral, Nightly PRN  OLANZapine (ZYPREXA) tablet 5 mg, 5 mg, Oral, Q8H PRN **OR** OLANZapine (ZYPREXA) injection 5 mg, 5 mg, IntraMUSCular, Q8H PRN  sterile water injection 2.1 mL, 2.1 mL, IntraMUSCular, Q4H PRN        ASSESSMENT AND PLAN     Principal Problem:    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 Patient's symptoms show no change  2. Probable discharge is next week  3. Discharge planning is incomplete     PLAN:  Continue current meds  Discharge planning underway

## 2022-02-16 NOTE — PROGRESS NOTES
Physical Therapy    Attempted to see pt for PT tx. Pt asleep in room upon arrival. When woken pt refusing to work with therapy and requesting to rest right now. Will follow-up as PT schedule and pt status permit. No Charge.     Cuate Cordova, PT, DPT, OMT-C # [id number]

## 2022-02-16 NOTE — BH NOTE
Senior Purposeful Rounding    Position:Repositions Self    Physical Environment:Room free from clutter, Clear path to bathroom , Adequate lighting, Bed alarm functioning and No safety hazards noted    Pain Rating/ Nonverbal Pain Behaviors:0; asleep    Pain interventions Attempted: n/a    Patient Toileted:Continent

## 2022-02-17 PROCEDURE — 6370000000 HC RX 637 (ALT 250 FOR IP): Performed by: NURSE PRACTITIONER

## 2022-02-17 PROCEDURE — 1240000000 HC EMOTIONAL WELLNESS R&B

## 2022-02-17 PROCEDURE — 97116 GAIT TRAINING THERAPY: CPT

## 2022-02-17 PROCEDURE — 6370000000 HC RX 637 (ALT 250 FOR IP): Performed by: PSYCHIATRY & NEUROLOGY

## 2022-02-17 PROCEDURE — 99231 SBSQ HOSP IP/OBS SF/LOW 25: CPT | Performed by: NURSE PRACTITIONER

## 2022-02-17 PROCEDURE — 99232 SBSQ HOSP IP/OBS MODERATE 35: CPT | Performed by: PSYCHIATRY & NEUROLOGY

## 2022-02-17 PROCEDURE — 97535 SELF CARE MNGMENT TRAINING: CPT

## 2022-02-17 RX ORDER — MIRTAZAPINE 15 MG/1
15 TABLET, FILM COATED ORAL NIGHTLY
Status: DISCONTINUED | OUTPATIENT
Start: 2022-02-17 | End: 2022-02-18 | Stop reason: HOSPADM

## 2022-02-17 RX ORDER — NITROFURANTOIN 25; 75 MG/1; MG/1
100 CAPSULE ORAL ONCE
Status: COMPLETED | OUTPATIENT
Start: 2022-02-17 | End: 2022-02-17

## 2022-02-17 RX ADMIN — MIRTAZAPINE 15 MG: 15 TABLET, FILM COATED ORAL at 20:07

## 2022-02-17 RX ADMIN — NITROFURANTOIN (MONOHYDRATE/MACROCRYSTALS) 100 MG: 75; 25 CAPSULE ORAL at 14:06

## 2022-02-17 ASSESSMENT — PAIN SCALES - GENERAL: PAINLEVEL_OUTOF10: 0

## 2022-02-17 NOTE — PROGRESS NOTES
Progress Note    Admit Date:  2/10/2022    Subjective:  Ms. Dev George seen at the table eating lunch. Denies any complaints. Objective:   Vitals:    02/17/22 0803   BP: 132/76   Pulse: 84   Resp: 18   Temp: 98.2 °F (36.8 °C)   SpO2: 93%            Intake/Output Summary (Last 24 hours) at 2/17/2022 1227  Last data filed at 2/17/2022 0458  Gross per 24 hour   Intake 240 ml   Output --   Net 240 ml       Physical Exam:    Gen: No distress. Alert. Eyes: PERRL. No sclera icterus. No conjunctival injection. ENT: No discharge. Pharynx clear. Neck: No JVD. No Carotid Bruit. Trachea midline. Resp: No accessory muscle use. No crackles. No wheezes. No rhonchi. CV: Regular rate. Regular rhythm. No murmur. No rub. No edema. GI: Non-tender. Non-distended. Normal bowel sounds. Skin: Warm and dry. No nodule on exposed extremities. No rash on exposed extremities. M/S: No cyanosis. No joint deformity. No clubbing. Neuro: Awake. No focal neurologic deficit on exam. Psych: Per psychiatry team evaluation     Data:  CBC: No results for input(s): WBC, HGB, HCT, MCV, PLT in the last 72 hours. BMP: No results for input(s): NA, K, CL, CO2, PHOS, BUN, CREATININE, CA in the last 72 hours. LIVER PROFILE: No results for input(s): AST, ALT, LIPASE, BILIDIR, BILITOT, ALKPHOS in the last 72 hours. Invalid input(s): AMYLASE,  ALB  PT/INR: No results for input(s): PROTIME, INR in the last 72 hours.     CULTURES  Susceptibility     Escherichia coli (esbl)     BACTERIAL SUSCEPTIBILITY PANEL BY FRANCISCO     amoxicillin-clavulanate <=8/4 mcg/mL Sensitive     ampicillin >16 mcg/mL Resistant     ampicillin-sulbactam <=8/4 mcg/mL Sensitive     ceFAZolin >16 mcg/mL Resistant     cefepime >16 mcg/mL Resistant     cefTRIAXone >32 mcg/mL Resistant     cefuroxime >16 mcg/mL Resistant     ciprofloxacin <=1 mcg/mL Sensitive     ertapenem <=0.5 mcg/mL Sensitive     gentamicin <=4 mcg/mL Sensitive     meropenem <=1 mcg/mL Sensitive nitrofurantoin <=32 mcg/mL Sensitive     trimethoprim-sulfamethoxazole <=2/38 mcg/mL Sensitive      COVID: not detected      Assessment/Plan:  #Delirium  - per psychiatry team     #UTI  - macrobid D#4.5/5  - urine cx as above. Diet: ADULT DIET;  Regular  Code Status: Full Code    Dorothy Baum [facility]  2/17/2022

## 2022-02-17 NOTE — PLAN OF CARE
Pt. Is irritable and sarcastic with dry humor which seems to be her personality, smiles and laughs at sarcastic humor is returned to her. Able to express needs, withdrawn to her bedroom, limited mobility, x2 assist with transfers PT and OT is working with her for strength and ADLs, good appetite, Denies SI HI AVH. Cooperative and compliant with care.

## 2022-02-17 NOTE — PROGRESS NOTES
Behavioral Services                                              Medicare Re-Certification    I certify that the inpatient psychiatric hospital services furnished since the previous certification/re-certification were, and continue to be, medically necessary for;    [x] (1) Treatment which could reasonably be expected to improve the patient's condition,    [x] (2) Or for diagnostic study. Estimated length of stay/service 1-2 days    Plan for post-hospital care Assisted living    This patient continues to need, on a daily basis, active treatment furnished directly by or requiring the supervision of inpatient psychiatric personnel.     Electronically signed by Sandy Monaco MD on 2/17/2022 at 3:12 PM
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
## 2022-02-17 NOTE — PROGRESS NOTES
Department of Psychiatry  AttendingProgress Note    Chief Complaint:\"when do I go home? \"    Marcelino Hector is in good spirits. Her POA came to visit. They are sitting together, and Marcelino Hector is smiling and laughing. Marcelino Hector says she feels better and is looking forward to going home. Her POA agrees, says Marcelino Hector is looking good, and thinks that Marcelino Hector is ready to be discharged. Marcelino Hector wants to go back to [place]. She agrees that she needs to be more patient with staff. The treatment team met and discussed the treatment plan. SUBJECTIVE:        Suicidal ideation:  denies suicidal ideation. Patient does not have medication side effects. ROS: Patient has new complaints: no  Sleeping adequately:  Yes   Appetite adequate: Yes  Attending groups: No:       OBJECTIVE    Physical  Vitals:    Blood pressure 132/76, pulse 84, temperature 98.2 °F (36.8 °C), temperature source Temporal, resp. rate 18, height 5' 3\" (1.6 m), weight 158 lb (71.7 kg), SpO2 93 %.   General appearance:  [x]  appears age, []  appears older than stated age,     [x]  adequately dressed and groomed, [] disheveled,                []  in no acute distress, [] appears mildly distressed,      []  Other:      MUSCULOSKELETAL:   Gait:   [] normal, [] antalgic, [] unsteady, [x] in w/c, gait not evaluated   Station:  [] erect, [x] sitting, [] recumbent, [] other        Strength/tone:  [x] muscle strength and tone appear consistent with age and condition     [] atrophy      [] abnormal movements  PSYCHIATRIC:    Relatedness:  [x] cooperative, [] guarded, [] indifferent, [] hostile,      [] sedated  Speech:  [x] normal prosody, [] pressured, [] decreased volume,    [] slurred, [] halting, [] slowed, [] loud     [] echolalia, [] incoherent, [] stuttering   Eye contact:  [x] direct, [] avoidant, [] intense  Kinetics:  [x] normal, [] increased, [] decreased  Mood:   [x] euthymic, [] depressed, [] anxious, [] irritable,     [] labile  Affect:   [x] normal range, smiles and injection 50 mg, 50 mg, IntraMUSCular, Q4H PRN  bisacodyl (DULCOLAX) EC tablet 5 mg, 5 mg, Oral, Daily PRN  melatonin tablet 3 mg, 3 mg, Oral, Nightly PRN  OLANZapine (ZYPREXA) tablet 5 mg, 5 mg, Oral, Q8H PRN **OR** OLANZapine (ZYPREXA) injection 5 mg, 5 mg, IntraMUSCular, Q8H PRN  sterile water injection 2.1 mL, 2.1 mL, IntraMUSCular, Q4H PRN    ASSESSMENT AND PLAN    Principal Problem:    Major neurocognitive disorder due to 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 Patient's symptoms are improving  2. Probable discharge is tomorrow  3. Discharge planning is complete      PLAN:  -Continue Remeron 15 mg QHS. Will change from Sol-Tab form to tab form today to reduced cost for patient.  -Continue Depakote Sprinkles PRN agitation. Has not required any since admission.

## 2022-02-17 NOTE — FLOWSHEET NOTE
Senior Purposeful Rounding    Position:Back    Physical Environment:Room free from clutter, Clear path to bathroom  and Adequate lighting    Pain Rating/ Nonverbal Pain Behaviors:0    Pain interventions Attempted: None     Patient Toileted:No- Void

## 2022-02-17 NOTE — FLOWSHEET NOTE
Group Therapy Note    Date: 2/17/2022  Start Time: 1000  End Time:  1100  Number of Participants: 5    Type of Group: Music group    Notes:  Pt participated in group, but left group towards the end of group.      Participation Level: Minimal    Speech:  normal    Endings: None Reported    Modes of Intervention: Socialization      Discipline Responsible: Spiritual care      Signature:  Du Valdivia
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
## 2022-02-17 NOTE — BH NOTE
Message left for pt's nurse at [facility] [place] ([facility] ([phone]) requesting a return call to coordinate dc back to them tomorrow.

## 2022-02-17 NOTE — PLAN OF CARE
Problem: Falls - Risk of:  Goal: Will remain free from falls  Description: Will remain free from falls  Outcome: Ongoing     Problem: Anger Management/Homicidal Ideation:  Goal: Able to display appropriate communication and problem solving  Description: Able to display appropriate communication and problem solving  Outcome: Ongoing     Problem: Anger Management/Homicidal Ideation:  Goal: Ability to verbalize frustrations and anger appropriately will improve  Description: Ability to verbalize frustrations and anger appropriately will improve  Outcome: Ongoing     Problem: Anger Management/Homicidal Ideation:  Goal: Absence of angry outbursts  Description: Absence of angry outbursts  Outcome: Ongoing     Problem: Anger Management/Homicidal Ideation:  Goal: Absence of homicidal ideation  Description: Absence of homicidal ideation  Outcome: Ongoing     Problem: Skin Integrity:  Goal: Absence of new skin breakdown  Description: Absence of new skin breakdown  Outcome: Ongoing   Pt pleasant but demanding. Refused medication. No outbursts or anger. Denies SI/HI, AH/VH. Free of falls. Skin dry and intact. Denies pain. ADLs completed with standby assistance.

## 2022-02-17 NOTE — BH NOTE
Purposeful Rounding    Patient concerns reported: pt complaining back hurts     Nurse made aware:yes    Patient Turned and repositioned: Independent    Patient Toileted: Incontinent of bladder    Fall Precautions in Place: Yellow non-skid socks on, Bed alarm on and functioning properly, Bed locked in low position, Lighting appropriate, Room free of clutter and Clear path to bathroom      Electronically signed by Usha Cano on 2/17/22 at 9:44 AM EST

## 2022-02-18 VITALS
HEART RATE: 74 BPM | RESPIRATION RATE: 18 BRPM | HEIGHT: 63 IN | OXYGEN SATURATION: 94 % | TEMPERATURE: 98.3 F | DIASTOLIC BLOOD PRESSURE: 62 MMHG | BODY MASS INDEX: 28 KG/M2 | WEIGHT: 158 LBS | SYSTOLIC BLOOD PRESSURE: 112 MMHG

## 2022-02-18 PROCEDURE — 99239 HOSP IP/OBS DSCHRG MGMT >30: CPT | Performed by: PSYCHIATRY & NEUROLOGY

## 2022-02-18 PROCEDURE — 5130000000 HC BRIDGE APPOINTMENT

## 2022-02-18 RX ORDER — DIVALPROEX SODIUM 125 MG/1
250 CAPSULE, COATED PELLETS ORAL EVERY 8 HOURS PRN
Qty: 15 CAPSULE | Refills: 0 | Status: SHIPPED | OUTPATIENT
Start: 2022-02-18

## 2022-02-18 RX ORDER — MIRTAZAPINE 15 MG/1
15 TABLET, FILM COATED ORAL NIGHTLY
Qty: 7 TABLET | Refills: 0 | Status: SHIPPED | OUTPATIENT
Start: 2022-02-18

## 2022-02-18 NOTE — FLOWSHEET NOTE
Senior Purposeful Rounding    Position:Back    Physical Environment:Room free from clutter and Adequate lighting    Pain Rating/ Nonverbal Pain Behaviors:0    Pain interventions Attempted: None    Patient Toileted:No- Void

## 2022-02-18 NOTE — BH NOTE
Spoke with Leobardo (pt's nurse) at [facility] to coordinate pt's dc back to them. Pt was fully independent prior to current hospitalization and received no supportive services from them (administered own meds, did own housekeeping, cared for own ADLs, etc). They do have the ability to increase pt's services as needed. They will be able to assist with medication management, ADL's, and transfers upon her return. They also do have OT/PT available in house and would just need an order to initiate that. Clinical & orders can be faxed to [phone]. Clinical and med orders faxed to [place] at [facility] with pt's POA and updated her on pt's progress and plan to dc today.

## 2022-02-18 NOTE — PLAN OF CARE
Problem: Falls - Risk of:  Goal: Will remain free from falls  Description: Will remain free from falls  Outcome: Ongoing     Problem: Anger Management/Homicidal Ideation:  Goal: Able to display appropriate communication and problem solving  Description: Able to display appropriate communication and problem solving  Outcome: Ongoing     Problem: Anger Management/Homicidal Ideation:  Goal: Ability to verbalize frustrations and anger appropriately will improve  Description: Ability to verbalize frustrations and anger appropriately will improve  Outcome: Ongoing     Problem: Anger Management/Homicidal Ideation:  Goal: Absence of angry outbursts  Description: Absence of angry outbursts  Outcome: Ongoing     Problem: Skin Integrity:  Goal: Will show no infection signs and symptoms  Description: Will show no infection signs and symptoms  Outcome: Ongoing     Problem: Skin Integrity:  Goal: Absence of new skin breakdown  Description: Absence of new skin breakdown  Outcome: Ongoing   Pt isolated to room throughout evening. Alert w/ confusion. No angry outbursts. Denies SI/HI or AH/VH. Cooperative and medication compliant. Free of falls or injury. Incontinence of urine. Requires incontinence care. Zinc ointment applied to buttocks. Area skin reddened.

## 2022-02-18 NOTE — FLOWSHEET NOTE
Senior Purposeful Rounding    Position:Back    Physical Environment:Room free from clutter, Clear path to bathroom  and Adequate lighting    Pain Rating/ Nonverbal Pain Behaviors:denies    Pain interventions Attempted: Nothing needed.      Patient Toileted:Ross Lopez
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
## 2022-02-18 NOTE — FLOWSHEET NOTE
Senior Purposeful Rounding    Position:Back    Physical Environment:Room free from clutter and Adequate lighting    Pain Rating/ Nonverbal Pain Behaviors:0    Pain interventions Attempted: None    Patient Toileted:Incontinent of bladder. Incontinence care provided.

## 2022-02-18 NOTE — PLAN OF CARE
[facility]  Discharge Note    Pt discharged with followings belongings:   Dental Appliances:  (SANJIV, due to pt's refusal to cooperate)  Vision - Corrective Lenses: Glasses  Hearing Aid: None  Jewelry: None  Body Piercings Removed: N/A  Clothing: Pants,Shirt,Footwear  Were All Patient Medications Collected?: Not Applicable  Other Valuables: [address] sent home withpatient or returned to patient. Patient education on aftercare instructions: yes  Information faxed to HCP by LSW  at 6:36 PM .Patient verbalize understanding of AVS:  yes. Status EXAM upon discharge:  Status and Exam  Normal: No  Facial Expression: Exaggerated  Affect: Constricted  Level of Consciousness: Alert  Mood:Normal: No  Mood: Suspicious,Irritable  Motor Activity:Normal: No  Motor Activity: Decreased  Interview Behavior: Irritable (Cooperative with encouragement.)  Preception: [place] to Person,[place] to Time,[place] to Place,[place] to Situation  Attention:Normal: No  Attention: Distractible  Thought Processes: Circumstantial  Thought Content:Normal: No  Thought Content: Poverty of Content,Delusions  Hallucinations: None  Delusions: Yes  Delusions: Persecution  Memory:Normal: No  Memory: Poor Recent  Insight and Judgment: No  Insight and Judgment: Poor Judgment,Poor Insight  Present Suicidal Ideation: No  Present Homicidal Ideation: No      Metabolic Screening:    No results found for: LABA1C    Lab Results   Component Value Date    CHOL 233 (H) 02/10/2022     Lab Results   Component Value Date    TRIG 103 02/10/2022     Lab Results   Component Value Date    HDL 63 (H) 02/10/2022     No components found for: [facility] [place]  Lab Results   Component Value Date    LABVLDL 21 02/10/2022       Edenilson Avalos RN   Bridge Appointment completed: Reviewed Discharge Instructions with patient. Patient verbalizes understanding and agreement with the discharge plan using the teachback method.      Referral for Outpatient Tobacco Cessation Counseling, upon discharge (unruly X if applicable and completed):    ( )  Hospital staff assisted patient to call Quit Line or faxed referral                                   during hospitalization                  ( )  Recognizing danger situations (included triggers and roadblocks), if not completed on admission                    ( )  Coping skills (new ways to manage stress, exercise, relaxation techniques, changing routine, distraction), if not completed on admission                                                           ( )  Basic information about quitting (benefits of quitting, techniques in how to quit, available resources, if not completed on admission  ( ) Referral for counseling faxed to [facility]   ( ) Patient refused referral  ( ) Patient refused counseling  (x ) Patient refused smoking cessation medication upon discharge    Vaccinations (unruly X if applicable and completed):  ( ) Patient states already received influenza vaccine elsewhere  ( ) Patient received influenza vaccine during this hospitalization  (x ) Patient refused influenza vaccine at this time

## 2022-02-18 NOTE — DISCHARGE INSTR - COC
Continuity of Care Form    Patient Name: Aung Neely   :  1935  MRN:  [id number]    Admit date:  2/10/2022  Discharge date:  ***    Code Status Order: Full Code   Advance Directives:      Admitting Physician:  Sima Hilario MD  PCP: No primary care provider on file. Discharging Nurse: [facility] Unit/Room#: 2208/2208-01  Discharging Unit Phone Number: ***    Emergency Contact:   Extended Emergency Contact Information  Primary Emergency Contact: [facility]  Home Phone: [phone]  Work Phone: [phone]  Relation: Other  Secondary Emergency Contact: Betsy Phan  Home Phone: [phone]  Relation: None    Past Surgical History:  Past Surgical History:   Procedure Laterality Date    APPENDECTOMY      CHOLECYSTECTOMY      TONSILLECTOMY         Immunization History: There is no immunization history on file for this patient.     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, with behavioral disturbance ([facility] [address] F03.91       Isolation/Infection:   Isolation            No Isolation          Patient Infection Status       Infection Onset Added Last Indicated Last Indicated By Review Planned Expiration Resolved Resolved By    ESBL (Extended Spectrum Beta Lactamase) 02/10/22 02/14/22 02/10/22 Culture, Urine                Nurse Assessment:  Last Vital Signs: /62   Pulse 74   Temp 98.3 °F (36.8 °C) (Temporal)   Resp 18   Ht 5' 3\" (1.6 m)   Wt 158 lb (71.7 kg)   SpO2 94%   BMI 27.99 kg/m²     Last documented pain score (0-10 scale): Pain Level: 0  Last Weight:   Wt Readings from Last 1 Encounters:   22 158 lb (71.7 kg)     Mental Status:  {IP PT MENTAL STATUS:}    IV Access:  {[facility] [place]:477915932}    Nursing Mobility/ADLs:  Walking   {[facility] NJXY:929953532}  Transfer  {[facility] JYYQ:166650997}  Bathing  {[facility] DME JNFO:795400388}  Dressing  {CHP DME MDKB:548534153}  Toileting  {CHP DME WJOV:354113203}  Feeding  {CHP DME MAXP:562751282}  Med Admin  {CH[facility] DME JRSS:665712072}  Med Delivery   { [facility] MED Delivery:563300697}    Wound Care Documentation and Therapy:  Wound 13  Finger (Comment which one) Right (Active)   Number of days: 3001        Elimination:  Continence: Bowel: {YES / QP:56547}  Bladder: {YES / HW:04821}  Urinary Catheter: {Urinary Catheter:023946861}   Colostomy/Ileostomy/Ileal Conduit: {YES / FH:56005}       Date of Last BM: ***    Intake/Output Summary (Last 24 hours) at 2022 1216  Last data filed at 2022 1736  Gross per 24 hour   Intake 600 ml   Output --   Net 600 ml     I/O last 3 completed shifts:   In: 5 [P.O.:840]  Out: -     Safety Concerns:     [employer] Safety Concerns:455224096}    Impairments/Disabilities:      [employer] Impairments/Disabilities:195555019}    Nutrition Therapy:  Current Nutrition Therapy:   [employer] Diet List:148990599}    Routes of Feeding: {P DME Other Feedings:694310831}  Liquids: {Slp liquid thickness:05945}  Daily Fluid Restriction: {CHP DME Yes amt example:172209439}  Last Modified Barium Swallow with Video (Video Swallowing Test): {Done Not Done GMTX:340064851}    Treatments at the Time of Hospital Discharge:   Respiratory Treatments: ***  Oxygen Therapy:  {Therapy; copd oxygen:92104}  Ventilator:    { CC Vent ICZO:880419644}    Rehab Therapies: {THERAPEUTIC INTERVENTION:5301787584}  Weight Bearing Status/Restrictions: [employer] Weight Bearin}  Other Medical Equipment (for information only, NOT a DME order):  {EQUIPMENT:841037074}  Other Treatments: ***    Patient's personal belongings (please select all that are sent with patient):  {[facility] DME Belongings:853938233}    RN SIGNATURE:  {Esignature:070399021}    CASE MANAGEMENT/SOCIAL WORK SECTION    Inpatient Status Date: ***    Readmission Risk Assessment Score:  Readmission Risk              Risk of Unplanned Readmission:  6           Discharging to Facility/ Agency   Name:   Address:  Phone:  Fax:    Dialysis Facility (if applicable)   Name:  Address:  Dialysis Schedule:  Phone:  Fax:    / signature: {Esignature:688174600}    PHYSICIAN SECTION    Prognosis: {Prognosis:1018673014}    Condition at Discharge: Rosa Figueroa Patient Condition:494808062}    Rehab Potential (if transferring to Rehab): {Prognosis:9819616880}    Recommended Labs or Other Treatments After Discharge: ***    Physician Certification: I certify the above information and transfer of Stanton Raymond  is necessary for the continuing treatment of the diagnosis listed and that she requires {Admit to Appropriate Level of Care:44175} for {GREATER/LESS:171687631} 30 days.      Update Admission H&P: {CHP DME Changes in IQBBA:647783240}    PHYSICIAN SIGNATURE:  {Esignature:012100499}

## 2022-05-25 NOTE — FLOWSHEET NOTE
Senior Purposeful Rounding    Position:Back    Physical Environment:Room free from clutter, Clear path to bathroom  and Adequate lighting    Pain Rating/ Nonverbal Pain Behaviors:0    Pain interventions Attempted: None    Patient Toileted:No- Void

## 2023-06-17 ENCOUNTER — HOSPITAL ENCOUNTER (EMERGENCY)
Age: 88
Discharge: HOME | End: 2023-06-18
Attending: EMERGENCY MEDICINE
Payer: MEDICARE

## 2023-06-17 DIAGNOSIS — R11.11 VOMITING WITHOUT NAUSEA, UNSPECIFIED VOMITING TYPE: Primary | ICD-10-CM

## 2023-06-17 LAB
ALBUMIN SERPL-MCNC: 3.7 G/DL (ref 3.4–5)
ALP SERPL-CCNC: 61 U/L (ref 40–129)
ALT SERPL-CCNC: 11 U/L (ref 10–40)
ANION GAP SERPL CALCULATED.3IONS-SCNC: 8 MMOL/L (ref 3–16)
AST SERPL-CCNC: 16 U/L (ref 15–37)
BASOPHILS # BLD: 0 K/UL (ref 0–0.2)
BASOPHILS NFR BLD: 0.3 %
BILIRUB DIRECT SERPL-MCNC: <0.2 MG/DL (ref 0–0.3)
BILIRUB INDIRECT SERPL-MCNC: NORMAL MG/DL (ref 0–1)
BILIRUB SERPL-MCNC: 0.3 MG/DL (ref 0–1)
BUN SERPL-MCNC: 15 MG/DL (ref 7–20)
CALCIUM SERPL-MCNC: 9.4 MG/DL (ref 8.3–10.6)
CHLORIDE SERPL-SCNC: 100 MMOL/L (ref 99–110)
CO2 SERPL-SCNC: 29 MMOL/L (ref 21–32)
CREAT SERPL-MCNC: 0.7 MG/DL (ref 0.6–1.2)
DEPRECATED RDW RBC AUTO: 13.8 % (ref 12.4–15.4)
EOSINOPHIL # BLD: 0.1 K/UL (ref 0–0.6)
EOSINOPHIL NFR BLD: 0.6 %
GFR SERPLBLD CREATININE-BSD FMLA CKD-EPI: >60 ML/MIN/{1.73_M2}
GLUCOSE SERPL-MCNC: 152 MG/DL (ref 70–99)
HCT VFR BLD AUTO: 42.6 % (ref 36–48)
HGB BLD-MCNC: 14.4 G/DL (ref 12–16)
LYMPHOCYTES # BLD: 1.1 K/UL (ref 1–5.1)
LYMPHOCYTES NFR BLD: 12.9 %
MCH RBC QN AUTO: 32.7 PG (ref 26–34)
MCHC RBC AUTO-ENTMCNC: 33.9 G/DL (ref 31–36)
MCV RBC AUTO: 96.7 FL (ref 80–100)
MONOCYTES # BLD: 0.5 K/UL (ref 0–1.3)
MONOCYTES NFR BLD: 5.6 %
NEUTROPHILS # BLD: 6.6 K/UL (ref 1.7–7.7)
NEUTROPHILS NFR BLD: 80.6 %
PLATELET # BLD AUTO: 190 K/UL (ref 135–450)
PMV BLD AUTO: 9.5 FL (ref 5–10.5)
POTASSIUM SERPL-SCNC: 4.2 MMOL/L (ref 3.5–5.1)
PROT SERPL-MCNC: 6.7 G/DL (ref 6.4–8.2)
RBC # BLD AUTO: 4.41 M/UL (ref 4–5.2)
SODIUM SERPL-SCNC: 137 MMOL/L (ref 136–145)
WBC # BLD AUTO: 8.2 K/UL (ref 4–11)

## 2023-06-17 PROCEDURE — 99284 EMERGENCY DEPT VISIT MOD MDM: CPT

## 2023-06-17 PROCEDURE — 80076 HEPATIC FUNCTION PANEL: CPT

## 2023-06-17 PROCEDURE — 85025 COMPLETE CBC W/AUTO DIFF WBC: CPT

## 2023-06-17 PROCEDURE — 36415 COLL VENOUS BLD VENIPUNCTURE: CPT

## 2023-06-17 PROCEDURE — 80048 BASIC METABOLIC PNL TOTAL CA: CPT

## 2023-06-17 ASSESSMENT — PAIN - FUNCTIONAL ASSESSMENT: PAIN_FUNCTIONAL_ASSESSMENT: NONE - DENIES PAIN

## 2023-06-18 VITALS
TEMPERATURE: 97.9 F | DIASTOLIC BLOOD PRESSURE: 76 MMHG | WEIGHT: 167.99 LBS | BODY MASS INDEX: 31.72 KG/M2 | OXYGEN SATURATION: 94 % | RESPIRATION RATE: 14 BRPM | HEIGHT: 61 IN | SYSTOLIC BLOOD PRESSURE: 120 MMHG | HEART RATE: 80 BPM

## 2023-06-19 LAB
EKG ATRIAL RATE: 85 BPM
EKG DIAGNOSIS: NORMAL
EKG P AXIS: 62 DEGREES
EKG P-R INTERVAL: 140 MS
EKG Q-T INTERVAL: 384 MS
EKG QRS DURATION: 86 MS
EKG QTC CALCULATION (BAZETT): 456 MS
EKG R AXIS: 54 DEGREES
EKG T AXIS: 35 DEGREES
EKG VENTRICULAR RATE: 85 BPM

## 2024-04-07 NOTE — FLOWSHEET NOTE
Senior Purposeful Rounding    Position:Back    Physical Environment:Room free from clutter, Clear path to bathroom  and Adequate lighting    Pain Rating/ Nonverbal Pain Behaviors:0    Pain interventions Attempted: None    Patient Toileted: No void at this time. oral